# Patient Record
Sex: FEMALE | Race: WHITE | Employment: UNEMPLOYED | URBAN - NONMETROPOLITAN AREA
[De-identification: names, ages, dates, MRNs, and addresses within clinical notes are randomized per-mention and may not be internally consistent; named-entity substitution may affect disease eponyms.]

---

## 2017-08-17 ENCOUNTER — HOSPITAL ENCOUNTER (OUTPATIENT)
Age: 82
Discharge: HOME | End: 2017-08-17
Payer: MEDICARE

## 2017-08-17 LAB
AVERAGE GLUCOSE: 198 MG/DL (ref 70–126)
CHOLESTEROL, TOTAL: 172 MG/DL (ref 100–199)
CREATININE, URINE: 99.2 MG/DL
HBA1C MFR BLD: 8.6 % (ref 4.4–6.4)
HDLC SERPL-MCNC: 58 MG/DL
LDL CHOLESTEROL CALCULATED: 87 MG/DL
MICROALBUMIN UR-MCNC: 1.98 MG/DL
MICROALBUMIN/CREAT UR-RTO: 20 MG/G (ref 0–30)
THYROXINE (T4): 7.4 UG/DL (ref 4.5–12)
TRIGL SERPL-MCNC: 133 MG/DL (ref 0–199)
TSH SERPL DL<=0.05 MIU/L-ACNC: 3.71 UIU/ML (ref 0.4–4.2)

## 2017-08-17 PROCEDURE — 84436 ASSAY OF TOTAL THYROXINE: CPT

## 2017-08-17 PROCEDURE — 82043 UR ALBUMIN QUANTITATIVE: CPT

## 2017-08-17 PROCEDURE — 83036 HEMOGLOBIN GLYCOSYLATED A1C: CPT

## 2017-08-17 PROCEDURE — 84443 ASSAY THYROID STIM HORMONE: CPT

## 2017-08-17 PROCEDURE — 36415 COLL VENOUS BLD VENIPUNCTURE: CPT

## 2017-08-17 PROCEDURE — 80061 LIPID PANEL: CPT

## 2017-11-16 ENCOUNTER — HOSPITAL ENCOUNTER (OUTPATIENT)
Age: 82
Discharge: HOME | End: 2017-11-16
Payer: COMMERCIAL

## 2017-11-16 LAB
ALBUMIN SERPL-MCNC: 4.1 G/DL (ref 3.5–5.1)
ALP BLD-CCNC: 59 U/L (ref 38–126)
ALT SERPL-CCNC: 17 U/L (ref 11–66)
ANION GAP SERPL CALCULATED.3IONS-SCNC: 15 MEQ/L (ref 8–16)
AST SERPL-CCNC: 19 U/L (ref 5–40)
AVERAGE GLUCOSE: 189 MG/DL (ref 70–126)
BASOPHILS # BLD: 0.3 %
BASOPHILS ABSOLUTE: 0 THOU/MM3 (ref 0–0.1)
BILIRUB SERPL-MCNC: 0.5 MG/DL (ref 0.3–1.2)
BUN BLDV-MCNC: 16 MG/DL (ref 7–22)
CALCIUM SERPL-MCNC: 9.6 MG/DL (ref 8.5–10.5)
CHLORIDE BLD-SCNC: 102 MEQ/L (ref 98–111)
CO2: 27 MEQ/L (ref 23–33)
CREAT SERPL-MCNC: 0.9 MG/DL (ref 0.4–1.2)
CREATININE, URINE: 103.6 MG/DL
EOSINOPHIL # BLD: 2.1 %
EOSINOPHILS ABSOLUTE: 0.2 THOU/MM3 (ref 0–0.4)
GFR SERPL CREATININE-BSD FRML MDRD: 60 ML/MIN/1.73M2
GLUCOSE BLD-MCNC: 96 MG/DL (ref 70–108)
HBA1C MFR BLD: 8.3 % (ref 4.4–6.4)
HCT VFR BLD CALC: 35.8 % (ref 37–47)
HEMOGLOBIN: 11.7 GM/DL (ref 12–16)
LYMPHOCYTES # BLD: 19.1 %
LYMPHOCYTES ABSOLUTE: 1.5 THOU/MM3 (ref 1–4.8)
MCH RBC QN AUTO: 31.2 PG (ref 27–31)
MCHC RBC AUTO-ENTMCNC: 32.6 GM/DL (ref 33–37)
MCV RBC AUTO: 95.6 FL (ref 81–99)
MICROALBUMIN UR-MCNC: 4.48 MG/DL
MICROALBUMIN/CREAT UR-RTO: 43 MG/G (ref 0–30)
MONOCYTES # BLD: 5.7 %
MONOCYTES ABSOLUTE: 0.4 THOU/MM3 (ref 0.4–1.3)
NUCLEATED RED BLOOD CELLS: 0 /100 WBC
PDW BLD-RTO: 13.8 % (ref 11.5–14.5)
PLATELET # BLD: 215 THOU/MM3 (ref 130–400)
PMV BLD AUTO: 9.1 MCM (ref 7.4–10.4)
POTASSIUM SERPL-SCNC: 4.8 MEQ/L (ref 3.5–5.2)
RBC # BLD: 3.74 MILL/MM3 (ref 4.2–5.4)
SEG NEUTROPHILS: 72.8 %
SEGMENTED NEUTROPHILS ABSOLUTE COUNT: 5.6 THOU/MM3 (ref 1.8–7.7)
SODIUM BLD-SCNC: 144 MEQ/L (ref 135–145)
TOTAL PROTEIN: 6.7 G/DL (ref 6.1–8)
WBC # BLD: 7.7 THOU/MM3 (ref 4.8–10.8)

## 2017-11-16 PROCEDURE — 83036 HEMOGLOBIN GLYCOSYLATED A1C: CPT

## 2017-11-16 PROCEDURE — 82043 UR ALBUMIN QUANTITATIVE: CPT

## 2017-11-16 PROCEDURE — 85025 COMPLETE CBC W/AUTO DIFF WBC: CPT

## 2017-11-16 PROCEDURE — 80053 COMPREHEN METABOLIC PANEL: CPT

## 2017-11-16 PROCEDURE — 36415 COLL VENOUS BLD VENIPUNCTURE: CPT

## 2018-01-01 ENCOUNTER — HOSPITAL ENCOUNTER (OUTPATIENT)
Dept: GENERAL RADIOLOGY | Age: 83
Discharge: HOME | End: 2018-12-07
Payer: MEDICARE

## 2018-01-01 ENCOUNTER — HOSPITAL ENCOUNTER (OUTPATIENT)
Age: 83
Discharge: HOME | End: 2018-12-07
Payer: MEDICARE

## 2018-01-01 ENCOUNTER — HOSPITAL ENCOUNTER (OUTPATIENT)
Age: 83
Discharge: HOME | End: 2018-11-06
Payer: MEDICARE

## 2018-01-01 DIAGNOSIS — M25.552 LEFT HIP PAIN: ICD-10-CM

## 2018-01-01 LAB
ALBUMIN SERPL-MCNC: 3.9 G/DL (ref 3.5–5.1)
ALP BLD-CCNC: 65 U/L (ref 38–126)
ALT SERPL-CCNC: 91 U/L (ref 11–66)
ANION GAP SERPL CALCULATED.3IONS-SCNC: 15 MEQ/L (ref 8–16)
ANION GAP SERPL CALCULATED.3IONS-SCNC: 17 MEQ/L (ref 8–16)
AST SERPL-CCNC: 41 U/L (ref 5–40)
AVERAGE GLUCOSE: 144 MG/DL (ref 70–126)
BILIRUB SERPL-MCNC: 0.5 MG/DL (ref 0.3–1.2)
BUN BLDV-MCNC: 19 MG/DL (ref 7–22)
BUN BLDV-MCNC: 35 MG/DL (ref 7–22)
CALCIUM SERPL-MCNC: 9.3 MG/DL (ref 8.5–10.5)
CALCIUM SERPL-MCNC: 9.6 MG/DL (ref 8.5–10.5)
CHLORIDE BLD-SCNC: 105 MEQ/L (ref 98–111)
CHLORIDE BLD-SCNC: 111 MEQ/L (ref 98–111)
CO2: 20 MEQ/L (ref 23–33)
CO2: 22 MEQ/L (ref 23–33)
CREAT SERPL-MCNC: 1.2 MG/DL (ref 0.4–1.2)
CREAT SERPL-MCNC: 1.3 MG/DL (ref 0.4–1.2)
CREATININE, URINE: 103.5 MG/DL
GFR SERPL CREATININE-BSD FRML MDRD: 39 ML/MIN/1.73M2
GFR SERPL CREATININE-BSD FRML MDRD: 43 ML/MIN/1.73M2
GLUCOSE BLD-MCNC: 107 MG/DL (ref 70–108)
GLUCOSE BLD-MCNC: 163 MG/DL (ref 70–108)
HBA1C MFR BLD: 6.8 % (ref 4.4–6.4)
MICROALBUMIN UR-MCNC: 9.65 MG/DL
MICROALBUMIN/CREAT UR-RTO: 93 MG/G (ref 0–30)
POTASSIUM SERPL-SCNC: 4.9 MEQ/L (ref 3.5–5.2)
POTASSIUM SERPL-SCNC: 5.1 MEQ/L (ref 3.5–5.2)
SODIUM BLD-SCNC: 144 MEQ/L (ref 135–145)
SODIUM BLD-SCNC: 146 MEQ/L (ref 135–145)
TOTAL PROTEIN: 6.5 G/DL (ref 6.1–8)

## 2018-01-01 PROCEDURE — 36415 COLL VENOUS BLD VENIPUNCTURE: CPT

## 2018-01-01 PROCEDURE — 80048 BASIC METABOLIC PNL TOTAL CA: CPT

## 2018-01-01 PROCEDURE — 82043 UR ALBUMIN QUANTITATIVE: CPT

## 2018-01-01 PROCEDURE — 80053 COMPREHEN METABOLIC PANEL: CPT

## 2018-01-01 PROCEDURE — 83036 HEMOGLOBIN GLYCOSYLATED A1C: CPT

## 2018-01-01 PROCEDURE — 73502 X-RAY EXAM HIP UNI 2-3 VIEWS: CPT

## 2018-02-21 ENCOUNTER — HOSPITAL ENCOUNTER (OUTPATIENT)
Age: 83
Discharge: HOME | End: 2018-02-21
Payer: MEDICARE

## 2018-02-21 LAB
ANION GAP SERPL CALCULATED.3IONS-SCNC: 13 MEQ/L (ref 8–16)
AVERAGE GLUCOSE: 165 MG/DL (ref 70–126)
BUN BLDV-MCNC: 24 MG/DL (ref 7–22)
C-PEPTIDE: 4.1 NG/ML (ref 1.1–4.4)
CALCIUM SERPL-MCNC: 10.5 MG/DL (ref 8.5–10.5)
CHLORIDE BLD-SCNC: 106 MEQ/L (ref 98–111)
CO2: 25 MEQ/L (ref 23–33)
CREAT SERPL-MCNC: 1 MG/DL (ref 0.4–1.2)
CREATININE, URINE: 154.7 MG/DL
GFR SERPL CREATININE-BSD FRML MDRD: 53 ML/MIN/1.73M2
GLUCOSE BLD-MCNC: 155 MG/DL (ref 70–108)
HBA1C MFR BLD: 7.5 % (ref 4.4–6.4)
MICROALBUMIN UR-MCNC: 9.52 MG/DL
MICROALBUMIN/CREAT UR-RTO: 62 MG/G (ref 0–30)
POTASSIUM SERPL-SCNC: 5.1 MEQ/L (ref 3.5–5.2)
SODIUM BLD-SCNC: 144 MEQ/L (ref 135–145)

## 2018-02-21 PROCEDURE — 83036 HEMOGLOBIN GLYCOSYLATED A1C: CPT

## 2018-02-21 PROCEDURE — 84681 ASSAY OF C-PEPTIDE: CPT

## 2018-02-21 PROCEDURE — 80048 BASIC METABOLIC PNL TOTAL CA: CPT

## 2018-02-21 PROCEDURE — 82043 UR ALBUMIN QUANTITATIVE: CPT

## 2018-02-21 PROCEDURE — 36415 COLL VENOUS BLD VENIPUNCTURE: CPT

## 2018-07-02 ENCOUNTER — HOSPITAL ENCOUNTER (OUTPATIENT)
Age: 83
Discharge: HOME | End: 2018-07-02
Payer: MEDICARE

## 2018-07-02 LAB
ANION GAP SERPL CALCULATED.3IONS-SCNC: 16 MEQ/L (ref 8–16)
AVERAGE GLUCOSE: 180 MG/DL (ref 70–126)
BUN BLDV-MCNC: 24 MG/DL (ref 7–22)
CALCIUM SERPL-MCNC: 10 MG/DL (ref 8.5–10.5)
CHLORIDE BLD-SCNC: 102 MEQ/L (ref 98–111)
CHOLESTEROL, TOTAL: 120 MG/DL (ref 100–199)
CO2: 24 MEQ/L (ref 23–33)
CREAT SERPL-MCNC: 1.1 MG/DL (ref 0.4–1.2)
GFR SERPL CREATININE-BSD FRML MDRD: 47 ML/MIN/1.73M2
GLUCOSE BLD-MCNC: 161 MG/DL (ref 70–108)
HBA1C MFR BLD: 8 % (ref 4.4–6.4)
HDLC SERPL-MCNC: 61 MG/DL
LDL CHOLESTEROL CALCULATED: 44 MG/DL
POTASSIUM SERPL-SCNC: 4.9 MEQ/L (ref 3.5–5.2)
SODIUM BLD-SCNC: 142 MEQ/L (ref 135–145)
TRIGL SERPL-MCNC: 76 MG/DL (ref 0–199)
TSH SERPL DL<=0.05 MIU/L-ACNC: 1.96 UIU/ML (ref 0.4–4.2)

## 2018-07-02 PROCEDURE — 83036 HEMOGLOBIN GLYCOSYLATED A1C: CPT

## 2018-07-02 PROCEDURE — 80061 LIPID PANEL: CPT

## 2018-07-02 PROCEDURE — 84443 ASSAY THYROID STIM HORMONE: CPT

## 2018-07-02 PROCEDURE — 80048 BASIC METABOLIC PNL TOTAL CA: CPT

## 2018-07-02 PROCEDURE — 84436 ASSAY OF TOTAL THYROXINE: CPT

## 2018-07-02 PROCEDURE — 36415 COLL VENOUS BLD VENIPUNCTURE: CPT

## 2018-07-03 LAB — THYROXINE (T4): 10.3 UG/DL (ref 4.5–12)

## 2019-01-01 ENCOUNTER — APPOINTMENT (OUTPATIENT)
Dept: CT IMAGING | Age: 84
DRG: 280 | End: 2019-01-01
Payer: MEDICARE

## 2019-01-01 ENCOUNTER — HOSPITAL ENCOUNTER (INPATIENT)
Age: 84
LOS: 6 days | Discharge: HOSPICE | DRG: 280 | End: 2019-09-15
Attending: EMERGENCY MEDICINE | Admitting: HOSPITALIST
Payer: MEDICARE

## 2019-01-01 ENCOUNTER — APPOINTMENT (OUTPATIENT)
Dept: GENERAL RADIOLOGY | Age: 84
DRG: 280 | End: 2019-01-01
Payer: MEDICARE

## 2019-01-01 ENCOUNTER — APPOINTMENT (OUTPATIENT)
Dept: CARDIAC CATH/INVASIVE PROCEDURES | Age: 84
DRG: 280 | End: 2019-01-01
Payer: MEDICARE

## 2019-01-01 ENCOUNTER — APPOINTMENT (OUTPATIENT)
Dept: ULTRASOUND IMAGING | Age: 84
DRG: 280 | End: 2019-01-01
Payer: MEDICARE

## 2019-01-01 ENCOUNTER — HOSPITAL ENCOUNTER (OUTPATIENT)
Dept: GENERAL RADIOLOGY | Age: 84
Discharge: HOME | End: 2019-04-15
Payer: MEDICARE

## 2019-01-01 ENCOUNTER — HOSPITAL ENCOUNTER (OUTPATIENT)
Age: 84
Discharge: HOME | End: 2019-03-04
Payer: MEDICARE

## 2019-01-01 ENCOUNTER — HOSPITAL ENCOUNTER (OUTPATIENT)
Age: 84
Discharge: HOME | End: 2019-07-09
Payer: MEDICARE

## 2019-01-01 ENCOUNTER — HOSPITAL ENCOUNTER (OUTPATIENT)
Age: 84
Discharge: HOME | End: 2019-04-15
Payer: MEDICARE

## 2019-01-01 ENCOUNTER — HOSPITAL ENCOUNTER (INPATIENT)
Age: 84
LOS: 1 days | DRG: 951 | End: 2019-09-15
Attending: INTERNAL MEDICINE | Admitting: INTERNAL MEDICINE

## 2019-01-01 VITALS
OXYGEN SATURATION: 100 % | HEIGHT: 62 IN | WEIGHT: 132.4 LBS | SYSTOLIC BLOOD PRESSURE: 82 MMHG | BODY MASS INDEX: 24.37 KG/M2 | TEMPERATURE: 97.5 F | HEART RATE: 73 BPM | RESPIRATION RATE: 12 BRPM | DIASTOLIC BLOOD PRESSURE: 53 MMHG

## 2019-01-01 VITALS
WEIGHT: 132 LBS | TEMPERATURE: 97.5 F | OXYGEN SATURATION: 100 % | BODY MASS INDEX: 24.29 KG/M2 | DIASTOLIC BLOOD PRESSURE: 53 MMHG | HEIGHT: 62 IN | RESPIRATION RATE: 4 BRPM | SYSTOLIC BLOOD PRESSURE: 82 MMHG | HEART RATE: 73 BPM

## 2019-01-01 DIAGNOSIS — R79.89 ELEVATED BRAIN NATRIURETIC PEPTIDE (BNP) LEVEL: ICD-10-CM

## 2019-01-01 DIAGNOSIS — I21.4 NSTEMI (NON-ST ELEVATED MYOCARDIAL INFARCTION) (HCC): Primary | ICD-10-CM

## 2019-01-01 DIAGNOSIS — J18.9 PNEUMONIA DUE TO INFECTIOUS ORGANISM, UNSPECIFIED LATERALITY, UNSPECIFIED PART OF LUNG: ICD-10-CM

## 2019-01-01 DIAGNOSIS — R77.8 ELEVATED TROPONIN: ICD-10-CM

## 2019-01-01 LAB
ALBUMIN SERPL-MCNC: 3.5 G/DL (ref 3.5–5.1)
ALBUMIN SERPL-MCNC: 4.6 G/DL (ref 3.5–5.1)
ALP BLD-CCNC: 61 U/L (ref 38–126)
ALP BLD-CCNC: 77 U/L (ref 38–126)
ALT SERPL-CCNC: 149 U/L (ref 11–66)
ALT SERPL-CCNC: 239 U/L (ref 11–66)
AMMONIA: 27 UMOL/L (ref 11–60)
AMORPHOUS: ABNORMAL
ANION GAP SERPL CALCULATED.3IONS-SCNC: 12 MEQ/L (ref 8–16)
ANION GAP SERPL CALCULATED.3IONS-SCNC: 14 MEQ/L (ref 8–16)
ANION GAP SERPL CALCULATED.3IONS-SCNC: 17 MEQ/L (ref 8–16)
ANION GAP SERPL CALCULATED.3IONS-SCNC: 19 MEQ/L (ref 8–16)
ANION GAP SERPL CALCULATED.3IONS-SCNC: 22 MEQ/L (ref 8–16)
ANION GAP SERPL CALCULATED.3IONS-SCNC: 23 MEQ/L (ref 8–16)
ANION GAP SERPL CALCULATED.3IONS-SCNC: 23 MEQ/L (ref 8–16)
APTT: 103.4 SECONDS (ref 22–38)
APTT: 28 SECONDS (ref 22–38)
APTT: 48.9 SECONDS (ref 22–38)
APTT: 53.8 SECONDS (ref 22–38)
APTT: 58.2 SECONDS (ref 22–38)
APTT: 65.6 SECONDS (ref 22–38)
APTT: 76.4 SECONDS (ref 22–38)
APTT: 85.9 SECONDS (ref 22–38)
APTT: 91.1 SECONDS (ref 22–38)
AST SERPL-CCNC: 177 U/L (ref 5–40)
AST SERPL-CCNC: 343 U/L (ref 5–40)
AVERAGE GLUCOSE: 153 MG/DL (ref 70–126)
AVERAGE GLUCOSE: 156 MG/DL (ref 70–126)
BACTERIA: ABNORMAL /HPF
BASOPHILS # BLD: 0.4 %
BASOPHILS ABSOLUTE: 0 THOU/MM3 (ref 0–0.1)
BILIRUB SERPL-MCNC: 0.5 MG/DL (ref 0.3–1.2)
BILIRUB SERPL-MCNC: 0.6 MG/DL (ref 0.3–1.2)
BILIRUBIN DIRECT: 0.3 MG/DL (ref 0–0.3)
BILIRUBIN URINE: ABNORMAL
BLOOD, URINE: NEGATIVE
BUN BLDV-MCNC: 33 MG/DL (ref 7–22)
BUN BLDV-MCNC: 41 MG/DL (ref 7–22)
BUN BLDV-MCNC: 41 MG/DL (ref 7–22)
BUN BLDV-MCNC: 51 MG/DL (ref 7–22)
BUN BLDV-MCNC: 55 MG/DL (ref 7–22)
BUN BLDV-MCNC: 57 MG/DL (ref 7–22)
BUN BLDV-MCNC: 74 MG/DL (ref 7–22)
CALCIUM SERPL-MCNC: 10.1 MG/DL (ref 8.5–10.5)
CALCIUM SERPL-MCNC: 8 MG/DL (ref 8.5–10.5)
CALCIUM SERPL-MCNC: 8.3 MG/DL (ref 8.5–10.5)
CALCIUM SERPL-MCNC: 8.9 MG/DL (ref 8.5–10.5)
CALCIUM SERPL-MCNC: 9.2 MG/DL (ref 8.5–10.5)
CALCIUM SERPL-MCNC: 9.7 MG/DL (ref 8.5–10.5)
CALCIUM SERPL-MCNC: 9.7 MG/DL (ref 8.5–10.5)
CASTS 2: ABNORMAL /LPF
CASTS UA: ABNORMAL /LPF
CHARACTER, URINE: CLEAR
CHLORIDE BLD-SCNC: 101 MEQ/L (ref 98–111)
CHLORIDE BLD-SCNC: 103 MEQ/L (ref 98–111)
CHLORIDE BLD-SCNC: 103 MEQ/L (ref 98–111)
CHLORIDE BLD-SCNC: 104 MEQ/L (ref 98–111)
CHLORIDE BLD-SCNC: 104 MEQ/L (ref 98–111)
CHLORIDE BLD-SCNC: 95 MEQ/L (ref 98–111)
CHLORIDE BLD-SCNC: 99 MEQ/L (ref 98–111)
CHLORIDE, URINE: < 20 MEQ/L
CHOLESTEROL, TOTAL: 132 MG/DL (ref 100–199)
CO2: 14 MEQ/L (ref 23–33)
CO2: 15 MEQ/L (ref 23–33)
CO2: 17 MEQ/L (ref 23–33)
CO2: 19 MEQ/L (ref 23–33)
CO2: 22 MEQ/L (ref 23–33)
CO2: 23 MEQ/L (ref 23–33)
CO2: 24 MEQ/L (ref 23–33)
COLOR: YELLOW
CREAT SERPL-MCNC: 1.1 MG/DL (ref 0.4–1.2)
CREAT SERPL-MCNC: 1.5 MG/DL (ref 0.4–1.2)
CREAT SERPL-MCNC: 1.8 MG/DL (ref 0.4–1.2)
CREAT SERPL-MCNC: 2.2 MG/DL (ref 0.4–1.2)
CREAT SERPL-MCNC: 2.3 MG/DL (ref 0.4–1.2)
CREAT SERPL-MCNC: 2.3 MG/DL (ref 0.4–1.2)
CREAT SERPL-MCNC: 2.4 MG/DL (ref 0.4–1.2)
CREATININE, URINE: 63 MG/DL
CRYSTALS, UA: ABNORMAL
EKG ATRIAL RATE: 104 BPM
EKG P AXIS: 59 DEGREES
EKG P-R INTERVAL: 178 MS
EKG Q-T INTERVAL: 376 MS
EKG QRS DURATION: 164 MS
EKG QTC CALCULATION (BAZETT): 494 MS
EKG R AXIS: 99 DEGREES
EKG T AXIS: 3 DEGREES
EKG VENTRICULAR RATE: 104 BPM
EOSINOPHIL # BLD: 0.4 %
EOSINOPHILS ABSOLUTE: 0 THOU/MM3 (ref 0–0.4)
EPITHELIAL CELLS, UA: ABNORMAL /HPF
ERYTHROCYTE [DISTWIDTH] IN BLOOD BY AUTOMATED COUNT: 13.3 % (ref 11.5–14.5)
ERYTHROCYTE [DISTWIDTH] IN BLOOD BY AUTOMATED COUNT: 13.6 % (ref 11.5–14.5)
ERYTHROCYTE [DISTWIDTH] IN BLOOD BY AUTOMATED COUNT: 48.4 FL (ref 35–45)
ERYTHROCYTE [DISTWIDTH] IN BLOOD BY AUTOMATED COUNT: 48.5 FL (ref 35–45)
FLU A ANTIGEN: NEGATIVE
FLU B ANTIGEN: NEGATIVE
GFR SERPL CREATININE-BSD FRML MDRD: 19 ML/MIN/1.73M2
GFR SERPL CREATININE-BSD FRML MDRD: 20 ML/MIN/1.73M2
GFR SERPL CREATININE-BSD FRML MDRD: 20 ML/MIN/1.73M2
GFR SERPL CREATININE-BSD FRML MDRD: 21 ML/MIN/1.73M2
GFR SERPL CREATININE-BSD FRML MDRD: 27 ML/MIN/1.73M2
GFR SERPL CREATININE-BSD FRML MDRD: 33 ML/MIN/1.73M2
GFR SERPL CREATININE-BSD FRML MDRD: 47 ML/MIN/1.73M2
GLUCOSE BLD-MCNC: 102 MG/DL (ref 70–108)
GLUCOSE BLD-MCNC: 110 MG/DL (ref 70–108)
GLUCOSE BLD-MCNC: 187 MG/DL (ref 70–108)
GLUCOSE BLD-MCNC: 201 MG/DL (ref 70–108)
GLUCOSE BLD-MCNC: 206 MG/DL (ref 70–108)
GLUCOSE BLD-MCNC: 221 MG/DL (ref 70–108)
GLUCOSE BLD-MCNC: 229 MG/DL (ref 70–108)
GLUCOSE BLD-MCNC: 238 MG/DL (ref 70–108)
GLUCOSE BLD-MCNC: 242 MG/DL (ref 70–108)
GLUCOSE BLD-MCNC: 245 MG/DL (ref 70–108)
GLUCOSE BLD-MCNC: 247 MG/DL (ref 70–108)
GLUCOSE BLD-MCNC: 247 MG/DL (ref 70–108)
GLUCOSE BLD-MCNC: 259 MG/DL (ref 70–108)
GLUCOSE BLD-MCNC: 318 MG/DL (ref 70–108)
GLUCOSE BLD-MCNC: 352 MG/DL (ref 70–108)
GLUCOSE BLD-MCNC: 372 MG/DL (ref 70–108)
GLUCOSE URINE: NEGATIVE MG/DL
HBA1C MFR BLD: 7.1 % (ref 4.4–6.4)
HBA1C MFR BLD: 7.2 % (ref 4.4–6.4)
HCT VFR BLD CALC: 31.4 % (ref 37–47)
HCT VFR BLD CALC: 35.9 % (ref 37–47)
HDLC SERPL-MCNC: 59 MG/DL
HEMOGLOBIN: 10.1 GM/DL (ref 12–16)
HEMOGLOBIN: 11.1 GM/DL (ref 12–16)
ICTOTEST: NEGATIVE
IMMATURE GRANS (ABS): 0.05 THOU/MM3 (ref 0–0.07)
IMMATURE GRANULOCYTES: 0 %
KETONES, URINE: ABNORMAL
LDL CHOLESTEROL CALCULATED: 52 MG/DL
LEUKOCYTE ESTERASE, URINE: ABNORMAL
LIPASE: 42.2 U/L (ref 5.6–51.3)
LV EF: 23 %
LVEF MODALITY: NORMAL
LYMPHOCYTES # BLD: 12.3 %
LYMPHOCYTES ABSOLUTE: 1.4 THOU/MM3 (ref 1–4.8)
MAGNESIUM: 2.1 MG/DL (ref 1.6–2.4)
MAGNESIUM: 2.2 MG/DL (ref 1.6–2.4)
MCH RBC QN AUTO: 30.7 PG (ref 26–33)
MCH RBC QN AUTO: 31.5 PG (ref 26–33)
MCHC RBC AUTO-ENTMCNC: 30.9 GM/DL (ref 32.2–35.5)
MCHC RBC AUTO-ENTMCNC: 32.2 GM/DL (ref 32.2–35.5)
MCV RBC AUTO: 97.8 FL (ref 81–99)
MCV RBC AUTO: 99.4 FL (ref 81–99)
MICROALBUMIN UR-MCNC: 3.66 MG/DL
MICROALBUMIN/CREAT UR-RTO: 58 MG/G (ref 0–30)
MISCELLANEOUS LAB TEST RESULT: ABNORMAL
MONOCYTES # BLD: 6.1 %
MONOCYTES ABSOLUTE: 0.7 THOU/MM3 (ref 0.4–1.3)
NITRITE, URINE: NEGATIVE
NUCLEATED RED BLOOD CELLS: 0 /100 WBC
OSMOLALITY CALCULATION: 301.7 MOSMOL/KG (ref 275–300)
PH UA: 5.5 (ref 5–9)
PLATELET # BLD: 225 THOU/MM3 (ref 130–400)
PLATELET # BLD: 278 THOU/MM3 (ref 130–400)
PMV BLD AUTO: 10.2 FL (ref 9.4–12.4)
PMV BLD AUTO: 10.4 FL (ref 9.4–12.4)
POTASSIUM REFLEX MAGNESIUM: 3.8 MEQ/L (ref 3.5–5.2)
POTASSIUM SERPL-SCNC: 3.9 MEQ/L (ref 3.5–5.2)
POTASSIUM SERPL-SCNC: 4.5 MEQ/L (ref 3.5–5.2)
POTASSIUM SERPL-SCNC: 5 MEQ/L (ref 3.5–5.2)
POTASSIUM SERPL-SCNC: 5.2 MEQ/L (ref 3.5–5.2)
POTASSIUM SERPL-SCNC: 5.6 MEQ/L (ref 3.5–5.2)
POTASSIUM SERPL-SCNC: 6 MEQ/L (ref 3.5–5.2)
PRO-BNP: ABNORMAL PG/ML (ref 0–1800)
PROTEIN UA: 100
RBC # BLD: 3.21 MILL/MM3 (ref 4.2–5.4)
RBC # BLD: 3.61 MILL/MM3 (ref 4.2–5.4)
RBC URINE: ABNORMAL /HPF
RENAL EPITHELIAL, UA: ABNORMAL
SEG NEUTROPHILS: 80.4 %
SEGMENTED NEUTROPHILS ABSOLUTE COUNT: 9.1 THOU/MM3 (ref 1.8–7.7)
SODIUM BLD-SCNC: 134 MEQ/L (ref 135–145)
SODIUM BLD-SCNC: 139 MEQ/L (ref 135–145)
SODIUM BLD-SCNC: 140 MEQ/L (ref 135–145)
SODIUM BLD-SCNC: 141 MEQ/L (ref 135–145)
SODIUM BLD-SCNC: 141 MEQ/L (ref 135–145)
SODIUM URINE: 21 MEQ/L
SODIUM URINE: < 20 MEQ/L
SPECIFIC GRAVITY, URINE: >= 1.03 (ref 1–1.03)
TOTAL PROTEIN: 5.9 G/DL (ref 6.1–8)
TOTAL PROTEIN: 7.3 G/DL (ref 6.1–8)
TRIGL SERPL-MCNC: 106 MG/DL (ref 0–199)
TROPONIN T: 0.52 NG/ML
TROPONIN T: 0.65 NG/ML
TROPONIN T: 3.97 NG/ML
TROPONIN T: 5.9 NG/ML
TSH SERPL DL<=0.05 MIU/L-ACNC: 3.01 UIU/ML (ref 0.4–4.2)
UROBILINOGEN, URINE: 1 EU/DL (ref 0–1)
WBC # BLD: 11.3 THOU/MM3 (ref 4.8–10.8)
WBC # BLD: 14.3 THOU/MM3 (ref 4.8–10.8)
WBC UA: ABNORMAL /HPF
YEAST: ABNORMAL

## 2019-01-01 PROCEDURE — 93306 TTE W/DOPPLER COMPLETE: CPT

## 2019-01-01 PROCEDURE — APPNB15 APP NON BILLABLE TIME 0-15 MINS: Performed by: NURSE PRACTITIONER

## 2019-01-01 PROCEDURE — 1200000000 HC SEMI PRIVATE

## 2019-01-01 PROCEDURE — 99223 1ST HOSP IP/OBS HIGH 75: CPT | Performed by: INTERNAL MEDICINE

## 2019-01-01 PROCEDURE — 99232 SBSQ HOSP IP/OBS MODERATE 35: CPT | Performed by: INTERNAL MEDICINE

## 2019-01-01 PROCEDURE — 2709999900 HC NON-CHARGEABLE SUPPLY

## 2019-01-01 PROCEDURE — 6360000002 HC RX W HCPCS: Performed by: INTERNAL MEDICINE

## 2019-01-01 PROCEDURE — 6370000000 HC RX 637 (ALT 250 FOR IP): Performed by: INTERNAL MEDICINE

## 2019-01-01 PROCEDURE — 81001 URINALYSIS AUTO W/SCOPE: CPT

## 2019-01-01 PROCEDURE — 80048 BASIC METABOLIC PNL TOTAL CA: CPT

## 2019-01-01 PROCEDURE — 83735 ASSAY OF MAGNESIUM: CPT

## 2019-01-01 PROCEDURE — 93010 ELECTROCARDIOGRAM REPORT: CPT | Performed by: NUCLEAR MEDICINE

## 2019-01-01 PROCEDURE — 2580000003 HC RX 258: Performed by: HOSPITALIST

## 2019-01-01 PROCEDURE — 36415 COLL VENOUS BLD VENIPUNCTURE: CPT

## 2019-01-01 PROCEDURE — 87804 INFLUENZA ASSAY W/OPTIC: CPT

## 2019-01-01 PROCEDURE — 6360000002 HC RX W HCPCS: Performed by: HOSPITALIST

## 2019-01-01 PROCEDURE — 76770 US EXAM ABDO BACK WALL COMP: CPT

## 2019-01-01 PROCEDURE — 2060000000 HC ICU INTERMEDIATE R&B

## 2019-01-01 PROCEDURE — 84484 ASSAY OF TROPONIN QUANT: CPT

## 2019-01-01 PROCEDURE — 85027 COMPLETE CBC AUTOMATED: CPT

## 2019-01-01 PROCEDURE — 85730 THROMBOPLASTIN TIME PARTIAL: CPT

## 2019-01-01 PROCEDURE — 6370000000 HC RX 637 (ALT 250 FOR IP): Performed by: HOSPITALIST

## 2019-01-01 PROCEDURE — 82248 BILIRUBIN DIRECT: CPT

## 2019-01-01 PROCEDURE — 6370000000 HC RX 637 (ALT 250 FOR IP): Performed by: NURSE PRACTITIONER

## 2019-01-01 PROCEDURE — 93005 ELECTROCARDIOGRAM TRACING: CPT | Performed by: EMERGENCY MEDICINE

## 2019-01-01 PROCEDURE — 2580000003 HC RX 258: Performed by: INTERNAL MEDICINE

## 2019-01-01 PROCEDURE — 99285 EMERGENCY DEPT VISIT HI MDM: CPT

## 2019-01-01 PROCEDURE — 2580000003 HC RX 258: Performed by: EMERGENCY MEDICINE

## 2019-01-01 PROCEDURE — 94760 N-INVAS EAR/PLS OXIMETRY 1: CPT

## 2019-01-01 PROCEDURE — 84300 ASSAY OF URINE SODIUM: CPT

## 2019-01-01 PROCEDURE — 99233 SBSQ HOSP IP/OBS HIGH 50: CPT | Performed by: INTERNAL MEDICINE

## 2019-01-01 PROCEDURE — 83690 ASSAY OF LIPASE: CPT

## 2019-01-01 PROCEDURE — 2500000003 HC RX 250 WO HCPCS: Performed by: INTERNAL MEDICINE

## 2019-01-01 PROCEDURE — 99232 SBSQ HOSP IP/OBS MODERATE 35: CPT | Performed by: PHYSICIAN ASSISTANT

## 2019-01-01 PROCEDURE — 80053 COMPREHEN METABOLIC PANEL: CPT

## 2019-01-01 PROCEDURE — 85025 COMPLETE CBC W/AUTO DIFF WBC: CPT

## 2019-01-01 PROCEDURE — 80061 LIPID PANEL: CPT

## 2019-01-01 PROCEDURE — 82948 REAGENT STRIP/BLOOD GLUCOSE: CPT

## 2019-01-01 PROCEDURE — 82043 UR ALBUMIN QUANTITATIVE: CPT

## 2019-01-01 PROCEDURE — 83036 HEMOGLOBIN GLYCOSYLATED A1C: CPT

## 2019-01-01 PROCEDURE — 6370000000 HC RX 637 (ALT 250 FOR IP): Performed by: EMERGENCY MEDICINE

## 2019-01-01 PROCEDURE — 51798 US URINE CAPACITY MEASURE: CPT

## 2019-01-01 PROCEDURE — 99232 SBSQ HOSP IP/OBS MODERATE 35: CPT | Performed by: NURSE PRACTITIONER

## 2019-01-01 PROCEDURE — 99223 1ST HOSP IP/OBS HIGH 75: CPT | Performed by: HOSPITALIST

## 2019-01-01 PROCEDURE — 82140 ASSAY OF AMMONIA: CPT

## 2019-01-01 PROCEDURE — 73564 X-RAY EXAM KNEE 4 OR MORE: CPT

## 2019-01-01 PROCEDURE — 70450 CT HEAD/BRAIN W/O DYE: CPT

## 2019-01-01 PROCEDURE — 82436 ASSAY OF URINE CHLORIDE: CPT

## 2019-01-01 PROCEDURE — 6360000002 HC RX W HCPCS: Performed by: PHYSICIAN ASSISTANT

## 2019-01-01 PROCEDURE — 84443 ASSAY THYROID STIM HORMONE: CPT

## 2019-01-01 PROCEDURE — 71046 X-RAY EXAM CHEST 2 VIEWS: CPT

## 2019-01-01 PROCEDURE — 83880 ASSAY OF NATRIURETIC PEPTIDE: CPT

## 2019-01-01 PROCEDURE — 71045 X-RAY EXAM CHEST 1 VIEW: CPT

## 2019-01-01 PROCEDURE — 6360000002 HC RX W HCPCS: Performed by: EMERGENCY MEDICINE

## 2019-01-01 PROCEDURE — 73502 X-RAY EXAM HIP UNI 2-3 VIEWS: CPT

## 2019-01-01 RX ORDER — SODIUM CHLORIDE 0.9 % (FLUSH) 0.9 %
10 SYRINGE (ML) INJECTION PRN
Status: CANCELLED | OUTPATIENT
Start: 2019-01-01

## 2019-01-01 RX ORDER — LORAZEPAM 2 MG/ML
1 INJECTION INTRAMUSCULAR
Status: CANCELLED | OUTPATIENT
Start: 2019-01-01

## 2019-01-01 RX ORDER — SODIUM CHLORIDE 9 MG/ML
INJECTION, SOLUTION INTRAVENOUS CONTINUOUS
Status: DISCONTINUED | OUTPATIENT
Start: 2019-01-01 | End: 2019-01-01

## 2019-01-01 RX ORDER — ALBUMIN (HUMAN) 12.5 G/50ML
25 SOLUTION INTRAVENOUS ONCE
Status: DISCONTINUED | OUTPATIENT
Start: 2019-01-01 | End: 2019-01-01

## 2019-01-01 RX ORDER — DIPHENHYDRAMINE HYDROCHLORIDE, ZINC ACETATE 2; .1 G/100G; G/100G
CREAM TOPICAL 3 TIMES DAILY PRN
Status: DISCONTINUED | OUTPATIENT
Start: 2019-01-01 | End: 2019-01-01 | Stop reason: HOSPADM

## 2019-01-01 RX ORDER — HEPARIN SODIUM 10000 [USP'U]/100ML
12 INJECTION, SOLUTION INTRAVENOUS CONTINUOUS
Status: DISCONTINUED | OUTPATIENT
Start: 2019-01-01 | End: 2019-01-01

## 2019-01-01 RX ORDER — DEXTROSE MONOHYDRATE 50 MG/ML
100 INJECTION, SOLUTION INTRAVENOUS PRN
Status: DISCONTINUED | OUTPATIENT
Start: 2019-01-01 | End: 2019-01-01 | Stop reason: HOSPADM

## 2019-01-01 RX ORDER — GLYCOPYRROLATE 0.2 MG/ML
0.2 INJECTION INTRAMUSCULAR; INTRAVENOUS
Status: DISCONTINUED | OUTPATIENT
Start: 2019-01-01 | End: 2019-01-01 | Stop reason: HOSPADM

## 2019-01-01 RX ORDER — ATORVASTATIN CALCIUM 10 MG/1
10 TABLET, FILM COATED ORAL NIGHTLY
Status: DISCONTINUED | OUTPATIENT
Start: 2019-01-01 | End: 2019-01-01

## 2019-01-01 RX ORDER — 0.9 % SODIUM CHLORIDE 0.9 %
500 INTRAVENOUS SOLUTION INTRAVENOUS ONCE
Status: COMPLETED | OUTPATIENT
Start: 2019-01-01 | End: 2019-01-01

## 2019-01-01 RX ORDER — LORAZEPAM 2 MG/ML
0.5 INJECTION INTRAMUSCULAR
Status: DISCONTINUED | OUTPATIENT
Start: 2019-01-01 | End: 2019-09-16 | Stop reason: HOSPADM

## 2019-01-01 RX ORDER — MORPHINE SULFATE/PF 50 MG/50ML
PATIENT CONTROLLED ANALGESIA SYRINGE INTRAVENOUS CONTINUOUS
Status: DISCONTINUED | OUTPATIENT
Start: 2019-01-01 | End: 2019-01-01 | Stop reason: HOSPADM

## 2019-01-01 RX ORDER — DEXTROSE MONOHYDRATE 25 G/50ML
12.5 INJECTION, SOLUTION INTRAVENOUS PRN
Status: DISCONTINUED | OUTPATIENT
Start: 2019-01-01 | End: 2019-01-01 | Stop reason: HOSPADM

## 2019-01-01 RX ORDER — ONDANSETRON 2 MG/ML
4 INJECTION INTRAMUSCULAR; INTRAVENOUS EVERY 6 HOURS PRN
Status: DISCONTINUED | OUTPATIENT
Start: 2019-01-01 | End: 2019-01-01 | Stop reason: HOSPADM

## 2019-01-01 RX ORDER — SODIUM CHLORIDE 0.9 % (FLUSH) 0.9 %
10 SYRINGE (ML) INJECTION EVERY 12 HOURS SCHEDULED
Status: DISCONTINUED | OUTPATIENT
Start: 2019-01-01 | End: 2019-01-01

## 2019-01-01 RX ORDER — DIPHENHYDRAMINE HYDROCHLORIDE, ZINC ACETATE 2; .1 G/100G; G/100G
CREAM TOPICAL 3 TIMES DAILY PRN
Status: DISCONTINUED | OUTPATIENT
Start: 2019-01-01 | End: 2019-09-16 | Stop reason: HOSPADM

## 2019-01-01 RX ORDER — ONDANSETRON 2 MG/ML
4 INJECTION INTRAMUSCULAR; INTRAVENOUS EVERY 6 HOURS PRN
Status: DISCONTINUED | OUTPATIENT
Start: 2019-01-01 | End: 2019-09-16 | Stop reason: HOSPADM

## 2019-01-01 RX ORDER — GLIMEPIRIDE 1 MG/1
1 TABLET ORAL DAILY
Status: ON HOLD | COMMUNITY
End: 2019-01-01

## 2019-01-01 RX ORDER — ONDANSETRON 2 MG/ML
4 INJECTION INTRAMUSCULAR; INTRAVENOUS EVERY 6 HOURS PRN
Status: CANCELLED | OUTPATIENT
Start: 2019-01-01

## 2019-01-01 RX ORDER — DOCUSATE SODIUM 100 MG/1
100 CAPSULE, LIQUID FILLED ORAL DAILY
Status: DISCONTINUED | OUTPATIENT
Start: 2019-01-01 | End: 2019-01-01 | Stop reason: HOSPADM

## 2019-01-01 RX ORDER — MORPHINE SULFATE 4 MG/ML
4 INJECTION, SOLUTION INTRAMUSCULAR; INTRAVENOUS
Status: DISCONTINUED | OUTPATIENT
Start: 2019-01-01 | End: 2019-01-01 | Stop reason: HOSPADM

## 2019-01-01 RX ORDER — SODIUM POLYSTYRENE SULFONATE 15 G/60ML
30 SUSPENSION ORAL; RECTAL ONCE
Status: COMPLETED | OUTPATIENT
Start: 2019-01-01 | End: 2019-01-01

## 2019-01-01 RX ORDER — LEVOTHYROXINE SODIUM 112 UG/1
112 TABLET ORAL DAILY
Status: DISCONTINUED | OUTPATIENT
Start: 2019-01-01 | End: 2019-01-01 | Stop reason: HOSPADM

## 2019-01-01 RX ORDER — MORPHINE SULFATE/PF 50 MG/50ML
PATIENT CONTROLLED ANALGESIA SYRINGE INTRAVENOUS CONTINUOUS
Status: DISCONTINUED | OUTPATIENT
Start: 2019-01-01 | End: 2019-09-16 | Stop reason: HOSPADM

## 2019-01-01 RX ORDER — SODIUM CHLORIDE 0.9 % (FLUSH) 0.9 %
10 SYRINGE (ML) INJECTION PRN
Status: DISCONTINUED | OUTPATIENT
Start: 2019-01-01 | End: 2019-09-16 | Stop reason: HOSPADM

## 2019-01-01 RX ORDER — HEPARIN SODIUM 1000 [USP'U]/ML
60 INJECTION, SOLUTION INTRAVENOUS; SUBCUTANEOUS ONCE
Status: COMPLETED | OUTPATIENT
Start: 2019-01-01 | End: 2019-01-01

## 2019-01-01 RX ORDER — DIPHENHYDRAMINE HYDROCHLORIDE 50 MG/ML
25 INJECTION INTRAMUSCULAR; INTRAVENOUS EVERY 6 HOURS PRN
Status: DISCONTINUED | OUTPATIENT
Start: 2019-01-01 | End: 2019-01-01 | Stop reason: HOSPADM

## 2019-01-01 RX ORDER — MORPHINE SULFATE 2 MG/ML
2 INJECTION, SOLUTION INTRAMUSCULAR; INTRAVENOUS
Status: DISCONTINUED | OUTPATIENT
Start: 2019-01-01 | End: 2019-01-01 | Stop reason: HOSPADM

## 2019-01-01 RX ORDER — DIPHENHYDRAMINE HYDROCHLORIDE 50 MG/ML
25 INJECTION INTRAMUSCULAR; INTRAVENOUS EVERY 6 HOURS PRN
Status: DISCONTINUED | OUTPATIENT
Start: 2019-01-01 | End: 2019-09-16 | Stop reason: HOSPADM

## 2019-01-01 RX ORDER — DIPHENHYDRAMINE HYDROCHLORIDE 50 MG/ML
25 INJECTION INTRAMUSCULAR; INTRAVENOUS EVERY 6 HOURS PRN
Status: CANCELLED | OUTPATIENT
Start: 2019-01-01

## 2019-01-01 RX ORDER — NICOTINE POLACRILEX 4 MG
15 LOZENGE BUCCAL PRN
Status: DISCONTINUED | OUTPATIENT
Start: 2019-01-01 | End: 2019-01-01 | Stop reason: HOSPADM

## 2019-01-01 RX ORDER — LORAZEPAM 2 MG/ML
1 INJECTION INTRAMUSCULAR
Status: DISCONTINUED | OUTPATIENT
Start: 2019-01-01 | End: 2019-09-16 | Stop reason: HOSPADM

## 2019-01-01 RX ORDER — MIDODRINE HYDROCHLORIDE 10 MG/1
10 TABLET ORAL
Status: DISCONTINUED | OUTPATIENT
Start: 2019-01-01 | End: 2019-01-01

## 2019-01-01 RX ORDER — 0.9 % SODIUM CHLORIDE 0.9 %
250 INTRAVENOUS SOLUTION INTRAVENOUS ONCE
Status: COMPLETED | OUTPATIENT
Start: 2019-01-01 | End: 2019-01-01

## 2019-01-01 RX ORDER — SIMVASTATIN 20 MG
20 TABLET ORAL DAILY
Status: DISCONTINUED | OUTPATIENT
Start: 2019-01-01 | End: 2019-01-01

## 2019-01-01 RX ORDER — ATORVASTATIN CALCIUM 10 MG/1
10 TABLET, FILM COATED ORAL DAILY
Status: ON HOLD | COMMUNITY
End: 2019-01-01

## 2019-01-01 RX ORDER — GLYCOPYRROLATE 0.2 MG/ML
0.2 INJECTION INTRAMUSCULAR; INTRAVENOUS
Status: CANCELLED | OUTPATIENT
Start: 2019-01-01

## 2019-01-01 RX ORDER — SODIUM CHLORIDE 0.9 % (FLUSH) 0.9 %
10 SYRINGE (ML) INJECTION PRN
Status: DISCONTINUED | OUTPATIENT
Start: 2019-01-01 | End: 2019-01-01 | Stop reason: HOSPADM

## 2019-01-01 RX ORDER — 0.9 % SODIUM CHLORIDE 0.9 %
250 INTRAVENOUS SOLUTION INTRAVENOUS ONCE
Status: DISCONTINUED | OUTPATIENT
Start: 2019-01-01 | End: 2019-01-01

## 2019-01-01 RX ORDER — LORAZEPAM 2 MG/ML
0.5 INJECTION INTRAMUSCULAR EVERY 6 HOURS PRN
Status: DISCONTINUED | OUTPATIENT
Start: 2019-01-01 | End: 2019-01-01 | Stop reason: SDUPTHER

## 2019-01-01 RX ORDER — ACETAMINOPHEN 325 MG/1
650 TABLET ORAL ONCE
Status: COMPLETED | OUTPATIENT
Start: 2019-01-01 | End: 2019-01-01

## 2019-01-01 RX ORDER — SIMVASTATIN 20 MG
20 TABLET ORAL NIGHTLY
Status: DISCONTINUED | OUTPATIENT
Start: 2019-01-01 | End: 2019-01-01 | Stop reason: ALTCHOICE

## 2019-01-01 RX ORDER — ACETAMINOPHEN 325 MG/1
650 TABLET ORAL EVERY 6 HOURS PRN
Status: DISCONTINUED | OUTPATIENT
Start: 2019-01-01 | End: 2019-01-01 | Stop reason: HOSPADM

## 2019-01-01 RX ORDER — DIPHENHYDRAMINE HYDROCHLORIDE, ZINC ACETATE 2; .1 G/100G; G/100G
CREAM TOPICAL 3 TIMES DAILY PRN
Status: CANCELLED | OUTPATIENT
Start: 2019-01-01

## 2019-01-01 RX ORDER — ASPIRIN 81 MG/1
324 TABLET, CHEWABLE ORAL ONCE
Status: COMPLETED | OUTPATIENT
Start: 2019-01-01 | End: 2019-01-01

## 2019-01-01 RX ORDER — MORPHINE SULFATE/PF 50 MG/50ML
PATIENT CONTROLLED ANALGESIA SYRINGE INTRAVENOUS CONTINUOUS
Status: CANCELLED | OUTPATIENT
Start: 2019-01-01

## 2019-01-01 RX ORDER — GLYCOPYRROLATE 1 MG/5 ML
0.2 SYRINGE (ML) INTRAVENOUS
Status: DISCONTINUED | OUTPATIENT
Start: 2019-01-01 | End: 2019-09-16 | Stop reason: HOSPADM

## 2019-01-01 RX ORDER — LORAZEPAM 2 MG/ML
0.5 INJECTION INTRAMUSCULAR
Status: CANCELLED | OUTPATIENT
Start: 2019-01-01

## 2019-01-01 RX ORDER — GLYCOPYRROLATE 0.2 MG/ML
0.2 INJECTION INTRAMUSCULAR; INTRAVENOUS
Status: DISCONTINUED | OUTPATIENT
Start: 2019-01-01 | End: 2019-01-01 | Stop reason: CLARIF

## 2019-01-01 RX ORDER — LOSARTAN POTASSIUM 50 MG/1
50 TABLET ORAL DAILY
Status: DISCONTINUED | OUTPATIENT
Start: 2019-01-01 | End: 2019-01-01

## 2019-01-01 RX ORDER — HEPARIN SODIUM 1000 [USP'U]/ML
60 INJECTION, SOLUTION INTRAVENOUS; SUBCUTANEOUS PRN
Status: DISCONTINUED | OUTPATIENT
Start: 2019-01-01 | End: 2019-01-01 | Stop reason: ALTCHOICE

## 2019-01-01 RX ORDER — LORAZEPAM 2 MG/ML
0.5 INJECTION INTRAMUSCULAR
Status: DISCONTINUED | OUTPATIENT
Start: 2019-01-01 | End: 2019-01-01 | Stop reason: HOSPADM

## 2019-01-01 RX ORDER — ASPIRIN 81 MG/1
81 TABLET, CHEWABLE ORAL DAILY
Status: DISCONTINUED | OUTPATIENT
Start: 2019-01-01 | End: 2019-01-01

## 2019-01-01 RX ORDER — HEPARIN SODIUM 1000 [USP'U]/ML
30 INJECTION, SOLUTION INTRAVENOUS; SUBCUTANEOUS PRN
Status: DISCONTINUED | OUTPATIENT
Start: 2019-01-01 | End: 2019-01-01 | Stop reason: ALTCHOICE

## 2019-01-01 RX ORDER — MORPHINE SULFATE 2 MG/ML
2 INJECTION, SOLUTION INTRAMUSCULAR; INTRAVENOUS
Status: DISCONTINUED | OUTPATIENT
Start: 2019-01-01 | End: 2019-01-01

## 2019-01-01 RX ADMIN — SODIUM POLYSTYRENE SULFONATE 30 G: 15 SUSPENSION ORAL; RECTAL at 13:31

## 2019-01-01 RX ADMIN — MORPHINE SULFATE 1 MG: 2 INJECTION, SOLUTION INTRAMUSCULAR; INTRAVENOUS at 09:10

## 2019-01-01 RX ADMIN — MORPHINE SULFATE 2 MG: 2 INJECTION, SOLUTION INTRAMUSCULAR; INTRAVENOUS at 08:03

## 2019-01-01 RX ADMIN — LORAZEPAM 1 MG: 2 INJECTION INTRAMUSCULAR; INTRAVENOUS at 20:08

## 2019-01-01 RX ADMIN — LORAZEPAM 0.5 MG: 2 INJECTION INTRAMUSCULAR; INTRAVENOUS at 22:20

## 2019-01-01 RX ADMIN — LORAZEPAM 0.5 MG: 2 INJECTION INTRAMUSCULAR; INTRAVENOUS at 07:22

## 2019-01-01 RX ADMIN — LORAZEPAM 0.5 MG: 2 INJECTION INTRAMUSCULAR; INTRAVENOUS at 00:44

## 2019-01-01 RX ADMIN — Medication 10 ML: at 17:26

## 2019-01-01 RX ADMIN — MORPHINE SULFATE 30 MG: 1 INJECTION INTRAVENOUS at 19:56

## 2019-01-01 RX ADMIN — INSULIN LISPRO 4 UNITS: 100 INJECTION, SOLUTION INTRAVENOUS; SUBCUTANEOUS at 12:48

## 2019-01-01 RX ADMIN — INSULIN LISPRO 2 UNITS: 100 INJECTION, SOLUTION INTRAVENOUS; SUBCUTANEOUS at 20:34

## 2019-01-01 RX ADMIN — SODIUM BICARBONATE: 84 INJECTION, SOLUTION INTRAVENOUS at 17:24

## 2019-01-01 RX ADMIN — MORPHINE SULFATE 2 MG: 2 INJECTION, SOLUTION INTRAMUSCULAR; INTRAVENOUS at 22:15

## 2019-01-01 RX ADMIN — ASPIRIN 81 MG 81 MG: 81 TABLET ORAL at 09:29

## 2019-01-01 RX ADMIN — TICAGRELOR 90 MG: 90 TABLET ORAL at 08:32

## 2019-01-01 RX ADMIN — DIPHENHYDRAMINE HYDROCHLORIDE 25 MG: 50 INJECTION, SOLUTION INTRAMUSCULAR; INTRAVENOUS at 18:54

## 2019-01-01 RX ADMIN — MORPHINE SULFATE 2 MG: 2 INJECTION, SOLUTION INTRAMUSCULAR; INTRAVENOUS at 07:22

## 2019-01-01 RX ADMIN — ASPIRIN 81 MG 324 MG: 81 TABLET ORAL at 23:43

## 2019-01-01 RX ADMIN — MORPHINE SULFATE 1 MG: 2 INJECTION, SOLUTION INTRAMUSCULAR; INTRAVENOUS at 03:13

## 2019-01-01 RX ADMIN — ACETAMINOPHEN 650 MG: 325 TABLET ORAL at 15:54

## 2019-01-01 RX ADMIN — SODIUM BICARBONATE: 84 INJECTION, SOLUTION INTRAVENOUS at 09:47

## 2019-01-01 RX ADMIN — MORPHINE SULFATE 1 MG: 2 INJECTION, SOLUTION INTRAMUSCULAR; INTRAVENOUS at 02:11

## 2019-01-01 RX ADMIN — HEPARIN SODIUM 15 UNITS/KG/HR: 10000 INJECTION, SOLUTION INTRAVENOUS at 08:36

## 2019-01-01 RX ADMIN — Medication 10 ML: at 08:49

## 2019-01-01 RX ADMIN — MORPHINE SULFATE: 1 INJECTION INTRAVENOUS at 08:50

## 2019-01-01 RX ADMIN — MORPHINE SULFATE 2 MG: 2 INJECTION, SOLUTION INTRAMUSCULAR; INTRAVENOUS at 15:08

## 2019-01-01 RX ADMIN — LORAZEPAM 0.5 MG: 2 INJECTION INTRAMUSCULAR; INTRAVENOUS at 17:50

## 2019-01-01 RX ADMIN — LORAZEPAM 0.5 MG: 2 INJECTION INTRAMUSCULAR; INTRAVENOUS at 12:31

## 2019-01-01 RX ADMIN — Medication 10 ML: at 19:40

## 2019-01-01 RX ADMIN — Medication 10 ML: at 16:22

## 2019-01-01 RX ADMIN — MIDODRINE HYDROCHLORIDE 10 MG: 10 TABLET ORAL at 08:33

## 2019-01-01 RX ADMIN — MORPHINE SULFATE 1 MG: 2 INJECTION, SOLUTION INTRAMUSCULAR; INTRAVENOUS at 19:40

## 2019-01-01 RX ADMIN — DIPHENHYDRAMINE HYDROCHLORIDE, ZINC ACETATE: 2; .1 CREAM TOPICAL at 19:29

## 2019-01-01 RX ADMIN — Medication 10 ML: at 20:35

## 2019-01-01 RX ADMIN — LORAZEPAM 0.5 MG: 2 INJECTION INTRAMUSCULAR; INTRAVENOUS at 15:44

## 2019-01-01 RX ADMIN — ACETAMINOPHEN 650 MG: 325 TABLET ORAL at 11:54

## 2019-01-01 RX ADMIN — LORAZEPAM 0.5 MG: 2 INJECTION INTRAMUSCULAR; INTRAVENOUS at 21:20

## 2019-01-01 RX ADMIN — HEPARIN SODIUM 12 UNITS/KG/HR: 10000 INJECTION, SOLUTION INTRAVENOUS at 00:14

## 2019-01-01 RX ADMIN — HEPARIN SODIUM 3670 UNITS: 1000 INJECTION, SOLUTION INTRAVENOUS; SUBCUTANEOUS at 00:13

## 2019-01-01 RX ADMIN — INSULIN LISPRO 2 UNITS: 100 INJECTION, SOLUTION INTRAVENOUS; SUBCUTANEOUS at 17:26

## 2019-01-01 RX ADMIN — Medication 50 MEQ: at 17:25

## 2019-01-01 RX ADMIN — MORPHINE SULFATE 2 MG: 2 INJECTION, SOLUTION INTRAMUSCULAR; INTRAVENOUS at 21:00

## 2019-01-01 RX ADMIN — MORPHINE SULFATE 2 MG: 2 INJECTION, SOLUTION INTRAMUSCULAR; INTRAVENOUS at 23:25

## 2019-01-01 RX ADMIN — ACETAMINOPHEN 650 MG: 325 TABLET ORAL at 00:13

## 2019-01-01 RX ADMIN — ASPIRIN 81 MG 81 MG: 81 TABLET ORAL at 08:49

## 2019-01-01 RX ADMIN — Medication 10 ML: at 08:33

## 2019-01-01 RX ADMIN — INSULIN LISPRO 4 UNITS: 100 INJECTION, SOLUTION INTRAVENOUS; SUBCUTANEOUS at 18:09

## 2019-01-01 RX ADMIN — MORPHINE SULFATE 2 MG: 2 INJECTION, SOLUTION INTRAMUSCULAR; INTRAVENOUS at 00:34

## 2019-01-01 RX ADMIN — MIDODRINE HYDROCHLORIDE 10 MG: 10 TABLET ORAL at 20:57

## 2019-01-01 RX ADMIN — MORPHINE SULFATE 1 MG: 2 INJECTION, SOLUTION INTRAMUSCULAR; INTRAVENOUS at 11:30

## 2019-01-01 RX ADMIN — ACETAMINOPHEN 650 MG: 325 TABLET ORAL at 08:39

## 2019-01-01 RX ADMIN — INSULIN LISPRO 10 UNITS: 100 INJECTION, SOLUTION INTRAVENOUS; SUBCUTANEOUS at 13:33

## 2019-01-01 RX ADMIN — MORPHINE SULFATE 1 MG: 2 INJECTION, SOLUTION INTRAMUSCULAR; INTRAVENOUS at 19:13

## 2019-01-01 RX ADMIN — ASPIRIN 81 MG 81 MG: 81 TABLET ORAL at 08:32

## 2019-01-01 RX ADMIN — MORPHINE SULFATE 1 MG: 2 INJECTION, SOLUTION INTRAMUSCULAR; INTRAVENOUS at 20:22

## 2019-01-01 RX ADMIN — ATORVASTATIN CALCIUM 10 MG: 10 TABLET, FILM COATED ORAL at 21:53

## 2019-01-01 RX ADMIN — SODIUM CHLORIDE 500 ML: 9 INJECTION, SOLUTION INTRAVENOUS at 01:50

## 2019-01-01 RX ADMIN — TICAGRELOR 90 MG: 90 TABLET ORAL at 21:53

## 2019-01-01 RX ADMIN — SODIUM CHLORIDE 250 ML: 9 INJECTION, SOLUTION INTRAVENOUS at 16:23

## 2019-01-01 RX ADMIN — INSULIN LISPRO 2 UNITS: 100 INJECTION, SOLUTION INTRAVENOUS; SUBCUTANEOUS at 21:05

## 2019-01-01 RX ADMIN — ONDANSETRON 4 MG: 2 INJECTION INTRAMUSCULAR; INTRAVENOUS at 21:53

## 2019-01-01 RX ADMIN — LORAZEPAM 0.5 MG: 2 INJECTION, SOLUTION INTRAMUSCULAR; INTRAVENOUS at 16:03

## 2019-01-01 RX ADMIN — LEVOTHYROXINE SODIUM 112 MCG: 112 TABLET ORAL at 06:38

## 2019-01-01 RX ADMIN — INSULIN LISPRO 4 UNITS: 100 INJECTION, SOLUTION INTRAVENOUS; SUBCUTANEOUS at 08:34

## 2019-01-01 RX ADMIN — SODIUM CHLORIDE: 9 INJECTION, SOLUTION INTRAVENOUS at 04:47

## 2019-01-01 RX ADMIN — LEVOTHYROXINE SODIUM 112 MCG: 112 TABLET ORAL at 09:28

## 2019-01-01 RX ADMIN — LORAZEPAM 0.5 MG: 2 INJECTION INTRAMUSCULAR; INTRAVENOUS at 12:56

## 2019-01-01 RX ADMIN — LORAZEPAM 0.5 MG: 2 INJECTION INTRAMUSCULAR; INTRAVENOUS at 21:15

## 2019-01-01 RX ADMIN — HEPARIN SODIUM 1840 UNITS: 1000 INJECTION INTRAVENOUS; SUBCUTANEOUS at 04:23

## 2019-01-01 RX ADMIN — DIPHENHYDRAMINE HYDROCHLORIDE, ZINC ACETATE: 2; .1 CREAM TOPICAL at 12:38

## 2019-01-01 RX ADMIN — MORPHINE SULFATE 2 MG: 2 INJECTION, SOLUTION INTRAMUSCULAR; INTRAVENOUS at 15:48

## 2019-01-01 RX ADMIN — LORAZEPAM 0.5 MG: 2 INJECTION INTRAMUSCULAR; INTRAVENOUS at 06:23

## 2019-01-01 RX ADMIN — ATORVASTATIN CALCIUM 10 MG: 10 TABLET, FILM COATED ORAL at 20:34

## 2019-01-01 RX ADMIN — MORPHINE SULFATE 2 MG: 2 INJECTION, SOLUTION INTRAMUSCULAR; INTRAVENOUS at 09:54

## 2019-01-01 RX ADMIN — LORAZEPAM 0.5 MG: 2 INJECTION INTRAMUSCULAR; INTRAVENOUS at 10:34

## 2019-01-01 RX ADMIN — SODIUM CHLORIDE 500 ML: 9 INJECTION, SOLUTION INTRAVENOUS at 23:40

## 2019-01-01 RX ADMIN — LORAZEPAM 0.5 MG: 2 INJECTION INTRAMUSCULAR; INTRAVENOUS at 09:01

## 2019-01-01 ASSESSMENT — PAIN DESCRIPTION - ORIENTATION
ORIENTATION: LEFT
ORIENTATION: OTHER (COMMENT)
ORIENTATION: OTHER (COMMENT)
ORIENTATION: LEFT

## 2019-01-01 ASSESSMENT — PAIN - FUNCTIONAL ASSESSMENT
PAIN_FUNCTIONAL_ASSESSMENT: ACTIVITIES ARE NOT PREVENTED
PAIN_FUNCTIONAL_ASSESSMENT: PREVENTS OR INTERFERES SOME ACTIVE ACTIVITIES AND ADLS

## 2019-01-01 ASSESSMENT — PAIN SCALES - GENERAL
PAINLEVEL_OUTOF10: 0
PAINLEVEL_OUTOF10: 3
PAINLEVEL_OUTOF10: 0
PAINLEVEL_OUTOF10: 3
PAINLEVEL_OUTOF10: 0
PAINLEVEL_OUTOF10: 0
PAINLEVEL_OUTOF10: 6
PAINLEVEL_OUTOF10: 0
PAINLEVEL_OUTOF10: 10
PAINLEVEL_OUTOF10: 0
PAINLEVEL_OUTOF10: 6
PAINLEVEL_OUTOF10: 6
PAINLEVEL_OUTOF10: 0
PAINLEVEL_OUTOF10: 0
PAINLEVEL_OUTOF10: 4
PAINLEVEL_OUTOF10: 0
PAINLEVEL_OUTOF10: 0
PAINLEVEL_OUTOF10: 5
PAINLEVEL_OUTOF10: 3
PAINLEVEL_OUTOF10: 10
PAINLEVEL_OUTOF10: 0
PAINLEVEL_OUTOF10: 6
PAINLEVEL_OUTOF10: 0
PAINLEVEL_OUTOF10: 1
PAINLEVEL_OUTOF10: 10
PAINLEVEL_OUTOF10: 3
PAINLEVEL_OUTOF10: 4
PAINLEVEL_OUTOF10: 0
PAINLEVEL_OUTOF10: 6
PAINLEVEL_OUTOF10: 0
PAINLEVEL_OUTOF10: 3
PAINLEVEL_OUTOF10: 0
PAINLEVEL_OUTOF10: 4

## 2019-01-01 ASSESSMENT — PAIN DESCRIPTION - DESCRIPTORS
DESCRIPTORS: ACHING
DESCRIPTORS: ACHING
DESCRIPTORS: ACHING;CONSTANT
DESCRIPTORS: ACHING
DESCRIPTORS: DISCOMFORT
DESCRIPTORS: PRESSURE
DESCRIPTORS: DISCOMFORT
DESCRIPTORS: ACHING
DESCRIPTORS: ACHING

## 2019-01-01 ASSESSMENT — PAIN DESCRIPTION - FREQUENCY
FREQUENCY: CONTINUOUS
FREQUENCY: INTERMITTENT
FREQUENCY: CONTINUOUS
FREQUENCY: CONTINUOUS

## 2019-01-01 ASSESSMENT — ENCOUNTER SYMPTOMS
PHOTOPHOBIA: 0
SHORTNESS OF BREATH: 0
EYE DISCHARGE: 0
BACK PAIN: 0
CHOKING: 0
TROUBLE SWALLOWING: 0
EYE ITCHING: 0
ABDOMINAL DISTENTION: 0
COUGH: 0
BLOOD IN STOOL: 0
NAUSEA: 0
ABDOMINAL PAIN: 0
SORE THROAT: 0
EYE PAIN: 0
VOMITING: 0
VOICE CHANGE: 0
CHEST TIGHTNESS: 0
EYE REDNESS: 0
WHEEZING: 0
CONSTIPATION: 0
SINUS PRESSURE: 0
RHINORRHEA: 0
DIARRHEA: 0

## 2019-01-01 ASSESSMENT — PAIN DESCRIPTION - LOCATION
LOCATION: HEAD
LOCATION: LEG
LOCATION: GENERALIZED
LOCATION: LEG

## 2019-01-01 ASSESSMENT — PAIN DESCRIPTION - PAIN TYPE
TYPE: CHRONIC PAIN
TYPE: ACUTE PAIN;CHRONIC PAIN
TYPE: CHRONIC PAIN
TYPE: ACUTE PAIN
TYPE: CHRONIC PAIN

## 2019-01-01 ASSESSMENT — PAIN DESCRIPTION - PROGRESSION
CLINICAL_PROGRESSION: NOT CHANGED

## 2019-01-01 ASSESSMENT — PAIN DESCRIPTION - DIRECTION
RADIATING_TOWARDS: ALL OVER
RADIATING_TOWARDS: ALL OVER

## 2019-01-01 ASSESSMENT — PAIN DESCRIPTION - ONSET
ONSET: ON-GOING
ONSET: SUDDEN

## 2019-01-01 ASSESSMENT — PAIN SCALES - WONG BAKER: WONGBAKER_NUMERICALRESPONSE: 6

## 2019-09-09 PROBLEM — I21.4 NSTEMI (NON-ST ELEVATED MYOCARDIAL INFARCTION) (HCC): Status: ACTIVE | Noted: 2019-01-01

## 2019-09-10 NOTE — ED NOTES
Pt resting in bed. VS reassessed. Call light in reach. Respirations regular. Pt appears anxious.       Brit Nava RN  09/10/19 3923

## 2019-09-10 NOTE — CONSULTS
currently hesitate due to the risk of HD  Renal evaluation to discuss risks  Beta blockers to decrease HR, monitor for hypotension  Continue Aspirin and Statin  2D Echo to evaluate LVSF/WMA  Further workup based on clinical course and patient/family wishes         Please do note hesitate to contact me for any further questions. Thank you for the opportunity to be involved in this patient's care.     Code Status: Limited    Electronically signed by Gaylyn Cockayne, MD on 9/10/2019 at 5:23 PM

## 2019-09-10 NOTE — ED TRIAGE NOTES
Pt comes to ED c/o fatigue and weakness for a couple of days. Pt states she is having left leg pain. Family states that this is a chronic issue and that she would need a hip replacement. Pt states that this all started when she was working outside.

## 2019-09-10 NOTE — ED NOTES
Patient transported to [facility]  by cart in stable condition. Patient monitored on cardiac telemetry. Patient on2  LPM O2 via nasal canula. IV infusing and line is patent.         Ophelia Brown, EMT-P  09/10/19 0131

## 2019-09-10 NOTE — CARE COORDINATION
[address] Transitions Interview     9/10/2019    Patient: Bertrand Hammans Patient : 1934   MRN: [id number]  Reason for Admission:   RARS: Readmission Risk Score: 9         Introduced self/role. Explained BPCI-A program and beneficiary letter. Patient verbalized understandment. Readmission Risk  Patient Active Problem List   Diagnosis    Hip arthritis    Diabetes mellitus ([facility]    Anemia associated with acute blood loss    Hyperlipidemia    NSTEMI (non-ST elevated myocardial infarction) [facility])       Inpatient Assessment  Care Transitions Summary    Care Transitions Inpatient Review  Medication Review  Are you able to afford your medications?:  Yes  How often do you have difficulty taking your medications?:  I always take them as prescribed. Housing Review  Who do you live with?:  Alone  Are you an active caregiver in your home?:  No  Social Support  Do you have a ?:  No  Do you have a [facility]?:  No  Durable Medical Equipment  Functional Review  Ability to seek help/take action for Emergent/Urgent situations i.e. fire, crime, inclement weather or health crisis. :  Independent  Ability handle personal hygiene needs (bathing/dressing/grooming): Independent  Ability to manage medications: Independent  Ability to prepare food:  Independent  Ability to maintain home (clean home, laundry): Independent  Ability to drive and/or has transportation:  Independent  Ability to do shopping:  Independent  Ability to manage finances:   Independent  Is patient able to live independently?:  No  Hearing and Vision  Care Transitions Interventions  No Identified Needs         Follow Up  Future Appointments   Date Time Provider Ronal Lee   9/10/2019  4:00 PM STR CARDIAC CATHETERIZATION [address] CATH [facility] [address] Maintenance  Health Maintenance Due   Topic Date Due    DEXA (modify frequency per FRAX score)  1999       Enriqueta Aguirre RN

## 2019-09-10 NOTE — ED NOTES
Braden, charge RN spoke with Dr. Holly Dockery about admitting pt to ICU instead of step down due to increased troponin and low BP. Braden, charge RN informed Dr. Holly Dockery no beds were available tonight on step down. Dr. Holly Dockery stated pt did not need an ICU bed.       Sukhjinder Fields RN  09/10/19 2404

## 2019-09-10 NOTE — FLOWSHEET NOTE
09/10/19 1121   Provider Notification   Reason for Communication Critical Value (comment)   Provider Name [place]   Provider Notification Physician   Method of Communication Secure Message   Response Other (Comment)  (will put orders in)   Notification Time 1121   Shift Event Other (comment)  (potassim level 6.0, creatinine 2.4)

## 2019-09-10 NOTE — CONSULTS
last several days. As stated above, she does report some  pain in her hip. Symptoms are worse with minimal exertion. She also  has had some shortness of breath per family members. It is also  reported to me that the patient's blood pressure was low. In the  emergency room yesterday, her blood pressure was in systolic 40G. Echocardiogram done earlier this morning now shows ejection fraction of  20% to 25% with mild-to-moderate pulmonary hypertension. PAST MEDICAL HISTORY:  Significant for diabetes, hypertension,  arthritis, thyroid disorder, and chronic kidney disease, stage 3. PAST SURGICAL HISTORY:  Significant for D and C, hip replacement. FAMILY HISTORY:  Significant for diabetes, high blood pressure, stroke. SOCIAL HISTORY:  No alcohol, smoking or illicit drug abuse. HOME MEDICATIONS:  Include Janumet, Cozaar, Lipitor and Synthroid. REVIEW OF SYSTEMS:  Positive for generalized weakness associated with  some shortness of breath on exertion, poor oral intake. Denies any  fever or chills. No headaches, no blurry vision. Denies any cough. Denies any vomiting or diarrhea. No dysuria or any gross hematuria. Denies any lower extremity swelling. No numbness or any tingling. No  palpitations. All other review of systems were reviewed and negative. CURRENT MEDICATIONS:  Include aspirin, levothyroxine, metoprolol,  simvastatin. ALLERGIES:  The patient does not have any known allergies. PHYSICAL EXAMINATION:  VITAL SIGNS:  Blood pressure is 96/58 with a heart rate of 106,  temperature is 97.4, respiratory rate is 18, pulse oximetry is 95%. GENERAL:  On examination, she is an elderly, frail-appearing female who  appears to be deconditioned. Does not appear to be in any acute  distress. HEENT:  Reveals normal conjunctivae without any icteric sclerae. Oral  cavity appears dry. NECK:  Supple without any JVD. Good range of motion.   LUNGS:  Air entry bilaterally without any M.D.    D: 09/10/2019 15:23:02       T: 09/10/2019 15:28:03     [email or web address]  Job#: [id number]     Doc#: [id number]    CC:

## 2019-09-10 NOTE — H&P
technology. **      Final report electronically signed by Dr. Tara Ardon on 9/9/2019 11:31 PM      XR CHEST 1 VW   Final Result   . Suspected chronic lung changes with minimal left base scarring of excluded. No other active pathology of the chest suspected. **This report has been created using voice recognition software. It may contain minor errors which are inherent in voice recognition technology. **      Final report electronically signed by Dr. Tara Ardon on 9/9/2019 11:24 PM      XR HIP 2-3 VW W PELVIS LEFT   Final Result   1. Chronic advanced degenerative arthropathy of the left hip without fracture or dislocation. 2. Postsurgical changes of right hip arthroplasty. Final report electronically signed by Dr. Tara Ardon on 9/9/2019 11:23 PM           DVT prophylaxis: Already on Anticoagulation    Code Status: Prior      Disposition:Rehab    Active Hospital Problems    Diagnosis Date Noted    [facility] [I21.4] 09/09/2019    Hyperlipidemia [E78.5] 09/05/2012    Diabetes [facility] [E11.9] 09/05/2012       PLAN:    1. NSTEMI - patient has no prior cardiac history. NPO, probable cardiac cath in AM.  Patient on heparin drip. Follow troponin. Echocardiogram, cardiology consult. Nitro PRN vs Nitro drip. Patient has hypotension. Watch for low BP.  2. DM2 - sliding scale insulin. Thank you Johnna Del Angel MD for the opportunity to be involved in this patient's care.     Electronically signed by Helder Christensen DO on 9/10/2019 at 12:13 AM

## 2019-09-10 NOTE — CARE COORDINATION
9/10/19, 2:13 PM      [address]       Admitted from: ED 9/9/2019/ 2204 Hospital day: 1   Location: -06/006-A Reason for admit: NSTEMI (non-ST elevated myocardial infarction) ([facility] [I21.4] Status: IP  Admit order signed?: yes  PMH:  has a past medical history of Anesthesia, Arthritis, Diabetes mellitus ([facility] Hyperlipidemia, Hypertension, Nausea & vomiting, and Thyroid disease. Procedure: 9/10 ECHO EF 20-25%  Pertinent abnormal Imaging:none  Medications:  Scheduled Meds:   aspirin  81 mg Oral Daily    levothyroxine  112 mcg Oral Daily    sodium chloride flush  10 mL Intravenous 2 times per day    metoprolol tartrate  12.5 mg Oral BID    simvastatin  20 mg Oral Nightly    insulin lispro  0-12 Units Subcutaneous TID WC    insulin lispro  0-6 Units Subcutaneous Nightly     Continuous Infusions:   sodium chloride 75 mL/hr at 09/10/19 0447    dextrose      heparin (porcine) 12 Units/kg/hr (09/10/19 0800)      Pertinent Info/Orders/Treatment Plan:  IVF/Heparin gtt continued. K+ 6, Creatinine 2.4 (1.8) worse, elevated Troponin/BNP/LFT/s; monitor. Nephrology following for will need optimization prior to Cardiac Cath. Cardiology following for NSTEMI  Diet: DIET CARB CONTROL; Diet NPO, After Midnight Exceptions are: Sips with Meds   Smoking status:  reports that she has never smoked. She does not have any smokeless tobacco history on file.    PCP: Archie Degroot MD  Readmission: none  Readmission Risk Score: 12%    Discharge Planning  Current Residence:  Private Residence  Living Arrangements:  Spouse/Significant Other   Support Systems:  Family Members, Spouse/Significant Other, Children  Current Services PTA:     Potential Assistance Needed:  N/A  Potential Assistance Purchasing Medications:  Yes  Does patient want to participate in local refill/ meds to beds program?  No  Type of Home Care Services:  None  Patient expects to be discharged to:  Home with   Expected Discharge date:

## 2019-09-10 NOTE — ED PROVIDER NOTES
[facility]  eMERGENCY dEPARTMENT eNCOUnter          CHIEF COMPLAINT       Chief Complaint   Patient presents with    Fatigue       Nurses Notes reviewed and I agree except as noted in the HPI. HISTORY OF PRESENT ILLNESS    Zaki Crockett is a 80 y.o. female who presents with generalized weakness. Patient states that she has been feeling good over the last several days. Patient states that she has significant pain in her left hip and her left knee. This is osteoarthritis. Currently she states she is able to ambulate however she feels like she cannot control herself. Patient is a diabetic. Blood sugars 318. At bedside the patient is able to move all her extremities without any difficulty. She is not complaining of a headache. Her pain score in her hip and knee is a 2 out of 10 worsened with ambulation. Patient does have some significant anxiety. She cannot really explain to me what is going on. Currently the patient is resting comfortably on the cot no apparent distress. INITIAL NIH STROKE SCALE    Time Performed: At presentation    1a. Level of consciousness:  0 - alert; keenly responsive  1b. Level of consciousness questions:  0 - answers both questions correctly  1c. Level of consciousness questions:  0 - performs both tasks correctly  2. Best Gaze:  0 - normal  3. Visual:  0 - no visual loss  4. Facial Palsy:  0 - normal symmetric movement  5a. Motor left arm:  0 - no drift, limb holds 90 (or 45) degrees for full 10 seconds  5b. Motor right arm:  0 - no drift, limb holds 90 (or 45) degrees for full 10 seconds  6a. Motor left le - no drift; leg holds 30 degree position for full 5 seconds  6b. Motor right le - no drift; leg holds 30 degree position for full 5 seconds  7. Limb Ataxia:  0 - absent  8. Sensory:  0 - normal; no sensory loss  9. Best Language:  0 - no aphasia, normal  10. Dysarthria:  0 - normal  11.   Extinction and these medications which have NOT CHANGED    Details   ibuprofen (ADVIL) 200 MG tablet Take 2 tablets by mouth every 6 hours as needed for Pain  Qty: 120 tablet, Refills: 3      levothyroxine (SYNTHROID) 112 MCG tablet Take 112 mcg by mouth Daily      aspirin 81 MG tablet Take 81 mg by mouth daily      metformin (GLUCOPHAGE) 500 MG tablet Take 500 mg by mouth 2 times daily (with meals). 2 tab   Indications: diabetes      glimepiride (AMARYL) 1 MG tablet Take 1 mg by mouth every morning (before breakfast). Doesn't take routinely only when sugar is high   Indications: diabetes      simvastatin (ZOCOR) 20 MG tablet Take 20 mg by mouth daily. Indications: cholesterol      losartan (COZAAR) 50 MG tablet Take 50 mg by mouth daily. Indications: blood pressure             ALLERGIES     has No Known Allergies. FAMILY HISTORY     She indicated that the status of her mother is unknown. She indicated that the status of her father is unknown. She indicated that the status of her sister is unknown. She indicated that the status of her maternal grandmother is unknown. She indicated that the status of her maternal aunt is unknown.   family history includes Cancer in her brother and sister; Diabetes in her brother, brother, maternal aunt, and maternal grandmother; Heart Disease in her father; High Blood Pressure in her mother; Stroke in her mother. SOCIAL HISTORY      reports that she has never smoked. She does not have any smokeless tobacco history on file. She reports that she does not drink alcohol or use drugs. PHYSICAL EXAM     INITIAL VITALS:  height is 5' 2\" (1.575 m) and weight is 133 lb 14.4 oz (60.7 kg). Her axillary temperature is 97 °F (36.1 °C). Her blood pressure is 95/52 (abnormal) and her pulse is 100. Her respiration is 22 and oxygen saturation is 98%. Physical Exam   Constitutional: She is oriented to person, place, and time. She appears well-developed and well-nourished. No distress.    HENT: Head: Normocephalic and atraumatic. Right Ear: Hearing, tympanic membrane, external ear and ear canal normal. No hemotympanum. No decreased hearing is noted. Left Ear: Hearing, tympanic membrane, external ear and ear canal normal. No hemotympanum. No decreased hearing is noted. Nose: Nose normal.   Mouth/Throat: Uvula is midline, oropharynx is clear and moist and mucous membranes are normal. No oropharyngeal exudate. Eyes: Pupils are equal, round, and reactive to light. Conjunctivae and EOM are normal. Right eye exhibits no discharge. Left eye exhibits no discharge. No scleral icterus. Fundoscopic exam:       The right eye shows no exudate, no hemorrhage and no papilledema. The left eye shows no exudate, no hemorrhage and no papilledema. Neck: Trachea normal, normal range of motion and full passive range of motion without pain. Neck supple. No hepatojugular reflux and no JVD present. No spinous process tenderness and no muscular tenderness present. Carotid bruit is not present. No neck rigidity. No tracheal deviation and normal range of motion present. No Brudzinski's sign and no Kernig's sign noted. No thyroid mass present. Cardiovascular: Normal rate, regular rhythm, normal heart sounds and intact distal pulses. Exam reveals no gallop and no friction rub. No murmur heard. Pulmonary/Chest: Effort normal and breath sounds normal. She has no wheezes. She has no rales. Abdominal: Soft. Bowel sounds are normal. She exhibits no mass. There is no tenderness. There is no rebound and no guarding. Musculoskeletal: Normal range of motion. She exhibits no edema or tenderness. Lymphadenopathy:     She has no cervical adenopathy. Neurological: She is alert and oriented to person, place, and time. She has normal strength and normal reflexes. She displays normal reflexes. No cranial nerve deficit or sensory deficit. She exhibits normal muscle tone.  Gait abnormal. Coordination normal. GCS eye subscore is 4. GCS verbal subscore is 5. GCS motor subscore is 6. Reflex Scores:       Tricep reflexes are 2+ on the right side and 2+ on the left side. Bicep reflexes are 2+ on the right side and 2+ on the left side. Brachioradialis reflexes are 2+ on the right side and 2+ on the left side. Patellar reflexes are 2+ on the right side and 2+ on the left side. Achilles reflexes are 2+ on the right side and 2+ on the left side. Patient has a slightly ataxic gait, this is normal for her according to family. Skin: Skin is warm and dry. No rash noted. She is not diaphoretic. Psychiatric: She has a normal mood and affect. Her behavior is normal. Judgment and thought content normal.   Nursing note and vitals reviewed. DIFFERENTIAL DIAGNOSIS:   Frontal diagnosis discussed extensively at bedside with the patient and family including but not limited to infection, metabolic encephalopathy, renal failure, AMI, stroke. DIAGNOSTIC RESULTS     EKG: All EKG's are interpreted by the Emergency Department Physician who either signs or Co-signs this chart in the absence of a cardiologist.  EKG revealed sinus tachycardia with ventricular rate of 104 right bundle branch block, slight right axis deviation, WY interval 178 QRS duration of 164 QT interval 376 QTC of 494. When compared with EKG dated September 21, 2016 we have T wave inversion in the precordial leads V1 through V6.    RADIOLOGY: non-plain film images(s) such as CT, Ultrasound and MRI are read by the radiologist.  [address] [place] [address]   Final Result   1. Stable cerebral atrophy changes. 2. Negative exam for active appearing pathology. 3. Chronic appearing changes of the left maxillary sinus discussed above. **This report has been created using voice recognition software. It may contain minor errors which are inherent in voice recognition technology. **      Final report electronically signed by Dr. Peggy Quinteroelor on

## 2019-09-11 NOTE — PROGRESS NOTES
Limited    Assessment/Plan:    Anticipated Discharge in : TBD     Active Hospital Problems    Diagnosis Date Noted    DIAZ (acute kidney injury) ([facility] [N17.9]     Hyperkalemia [G80.2]     Metabolic acidosis [H54.7]     CKD (chronic kidney disease), stage III ([facility] [W71.6]     Systolic dysfunction, left ventricle [I51.9]     Other hypotension [I95.89]     NSTEMI (non-ST elevated myocardial infarction) ([facility] [I21.4] 09/09/2019    Hyperlipidemia [E78.5] 09/05/2012    Diabetes mellitus ([facility] [E11.9] 09/05/2012       Assessment/Plan:     1. NSTEMI: no prior cardiac history. Elevated troponin. ECG changes with ischemia. Cont ASA, Statin, BB and heparin drip. Detailed discussion regarding risks of undergoing LHC and it causing pt to go on dialysis given DIAZ. Multidisciplinary discussion with family, Cardiology and Nephrology. Plan to hold off on LHC for now. Will cont to monitor kidney function. 2. DIAZ on CKD: baseline Cr 1.0-1.2, currently at 2.2. Stop Cozaar. IV fluids. Consult nephrology. Strict I/Os. Daily weights. 3. Essential HTN. Hold Cozaar given DIAZ. Monitor BP daily. 4. New Systolic CHF Diagnosis. 2D Echo showing EF 20% with severe global hypokinesis of LV. No evidence of fluid overload.           Electronically signed by Zahida Bella MD on 9/11/2019 at 2:52 PM

## 2019-09-11 NOTE — PROGRESS NOTES
* 343*   * 239*   BILITOT 0.6 0.5   ALKPHOS 77 61         Meds:  Infusion:    dextrose      sodium bicarbonate infusion 75 mL/hr at 09/11/19 0947    heparin (porcine) 13 Units/kg/hr (09/11/19 0959)     Meds:    aspirin  81 mg Oral Daily    levothyroxine  112 mcg Oral Daily    sodium chloride flush  10 mL Intravenous 2 times per day    metoprolol tartrate  12.5 mg Oral BID    insulin lispro  0-12 Units Subcutaneous TID WC    insulin lispro  0-6 Units Subcutaneous Nightly    atorvastatin  10 mg Oral Nightly     Meds prn: sodium chloride flush, magnesium hydroxide, ondansetron, acetaminophen, LORazepam, glucose, dextrose, glucagon (rDNA), dextrose, heparin (porcine), heparin (porcine)       Impression and Plan:  1. DIAZ on CKD III:creat slightly better but still high and above her baseline  - poor urine output, has borderline blood pressures (but EF ~20%)  - urine lytes reviewed, US:no acute obstruction  - will continue with IVF for now to improve perfusion  - start midodrine for hypotension  - had long discussion with patient's daughter (RAMO Rich ). Cardiology discussing with family regarding urgency of heart cath. - If LHC is urgent/emergent with rising troponin then can proceed with understanding that patient is at high risk of contrast nephropathy in current setting specially given poor cardiac output, hypotension and ongoing oliguria otherwise recommend to hold off for now. 2. Met acidosis: improved with bicarbonate drip  3. Hyperkalemia: resolved. 4. NSTEMI: asymptomatic at this time  5. Elevated liver enzymes:?shock liver vs congestive hepatopathy from low EF, clinically does not appear to be in volume overload stated however.    D/W patient and RN  D/w daughter  Astrid Schlatter, MD  [facility]

## 2019-09-11 NOTE — PROGRESS NOTES
edema, pulses present   Skin: warm and dry, no rash or erythema  Head: normocephalic and atraumatic   Musculoskeletal: normal range of motion, no joint swelling, deformity or tenderness  Neurological: alert, oriented, normal speech, no focal findings or movement disorder noted    Medications:    sodium chloride  250 mL Intravenous Once    aspirin  81 mg Oral Daily    levothyroxine  112 mcg Oral Daily    sodium chloride flush  10 mL Intravenous 2 times per day    metoprolol tartrate  12.5 mg Oral BID    insulin lispro  0-12 Units Subcutaneous TID WC    insulin lispro  0-6 Units Subcutaneous Nightly    atorvastatin  10 mg Oral Nightly      dextrose      sodium bicarbonate infusion 75 mL/hr at 19 0947    heparin (porcine) 13 Units/kg/hr (19 0959)       sodium chloride flush 10 mL PRN   magnesium hydroxide 30 mL Daily PRN   ondansetron 4 mg Q6H PRN   acetaminophen 650 mg Q6H PRN   LORazepam 0.5 mg Q6H PRN   glucose 15 g PRN   dextrose 12.5 g PRN   glucagon (rDNA) 1 mg PRN   dextrose 100 mL/hr PRN   heparin (porcine) 60 Units/kg PRN   heparin (porcine) 30 Units/kg PRN       Diagnostics:  EK-SEP-2019 22:09:36 [facility] ROUTINE RETRIEVAL  Sinus tachycardia  Possible Left atrial enlargement  Right bundle branch block  Septal infarct , age undetermined  Abnormal ECG  When compared with ECG of 21-SEP-2016 13:47,  Ventricular rate has increased BY 40 BPM  Questionable change in QRS duration  Minimal criteria for Inferior infarct are no longer Present  Confirmed by Melanie Sales ([id number]) on 9/10/2019 6:58:02 AM    Echo:   Electronically signed by Trae Cash MD (Interpreting   physician) on 09/10/2019 at 01:06 PM   ----------------------------------------------------------------      Findings      Mitral Valve   The mitral valve structure is normal with normal leaflet separation.   DOPPLER: The transmitral velocity was within the normal range with no   evidence for mitral 19 09/11/2019    BUN 57 09/11/2019    CREATININE 2.2 09/11/2019    LABGLOM 21 09/11/2019    GLUCOSE 221 09/11/2019    CALCIUM 8.3 09/11/2019       Hepatic Function Panel:    Lab Results   Component Value Date    ALKPHOS 61 09/11/2019     09/11/2019     09/11/2019    PROT 5.9 09/11/2019    BILITOT 0.5 09/11/2019    BILIDIR 0.3 09/09/2019    LABALBU 3.5 09/11/2019       Magnesium:    Lab Results   Component Value Date    MG 2.2 09/09/2019       PT/INR:    Lab Results   Component Value Date    INR 0.81 08/14/2012       HgBA1c:    Lab Results   Component Value Date    LABA1C 7.1 07/09/2019       FLP:    Lab Results   Component Value Date    TRIG 106 07/09/2019    HDL 59 07/09/2019    LDLCALC 52 07/09/2019       TSH:    Lab Results   Component Value Date    TSH 3.010 09/09/2019         Assessment:    NSTEMI    CDMP EF 20-25%    SHASHI / CKD stage 3    HTN  HLP  DM II      Plan:  · Long discussion with pt and mainly family re: DIAZ / NSTEMI  · Several questions answered re: why not cathing immediately - family doesn't want to risk HD  · She needs CDMP meds and LV   · Should family want to have immediate cardiac cath then they need to understand and accept the risk of HD need          Electronically signed by MURPHY Young CNP on 9/11/2019 at 5:28 PM

## 2019-09-11 NOTE — PROGRESS NOTES
Patient awake in chair. Vitals obtained check chart. Oriented to place and person, disoriented to situation and time. Patient stated that her leg was in pain \"7\" out of 10. Patient also stated that she was feeling very warm, patient ambulated to the chair via 2 assist. Patient stated being \"a little short of breath\". Mucous membranes pink, moist, and intact. Patient skin warm, moist, and intact. ROM in upper extremities limited. Heart sounds regular. Patient stated feeling \"weak\". Sensation present bilaterally. Bowel sounds active in all four quadrants. Lung sounds were clear bilaterally throughout. Patient has IV sites in the right and left wrist infusing. Patient has full sensation in the lower extremities. ROM is limited in lower extremities bilaterally. Pedal pulses were difficult to palpate 1+ amplitude. Lower extremities were cool to the touch, tan, and moist. Patient chair alarm on. Call light is within reach.

## 2019-09-11 NOTE — CARE COORDINATION
9/11/19, 10:19 AM      [address] day: 2  Location: [facility]06/006- Reason for admit: NSTEMI (non-ST elevated myocardial infarction) [facility]) [I21.4]   Procedure:   9/9 ECHO EF 20-25%  Treatment Plan of Care: WBC 14.3, Creatinine 2.2 (2.3) improved, elevated Troponin/BNP on admit; monitor. Cardiology following for NSTEMI, Nephrology following (optimization prior to Cardiac Cath), BP 80/s; monitor.  Heparin gtt, Bicarb gtt continued  PCP: Blayne Ortega MD  Readmission Risk Score: 17%  Discharge Plan: lives with spouse, daughter Leonela Turner is Palliative Care RN here at [facility] and spoke with her yesterday, family waiting to hear from Dr. Cali Petit per report; hold meet/greet for now; collaborated with Roseanna Finn RN

## 2019-09-12 NOTE — PROGRESS NOTES
Hospitalist Progress Note    Patient:  Lila Cameron      Unit/Bed:4K-06/006-A    YOB: 1934    MRN: [id number]       Acct: [de-identified]     PCP: Maicol Mosquera MD    Date of Admission: 9/9/2019    Chief Complaint: weakness     Hospital Course: 80year old female with a pmh of HTN, DM2 and HLD who presents with generalized weakness. Her symptoms started last night at home. Patient was resting and patient's daughter got a phone call by the patient and was told that patient was unable to move around.       Patient's symptoms wax and weaned throughout the day and it became suddenly worse again tonight and patient was brought to ER for evaluation. ER evaluation showed ischemic EKG change with significantly elevated troponin. Patient was started on heparin drip in ER and was also given ASA. Subjective: pt feels very weak this morning, no apatite. Denies any dizziness, chest pain or sob. Updated son, daughter and .        Medications:  Reviewed    Infusion Medications    dextrose      sodium bicarbonate infusion 75 mL/hr at 09/11/19 0947    heparin (porcine) 17 Units/kg/hr (09/12/19 0547)     Scheduled Medications    ticagrelor  90 mg Oral BID    midodrine  10 mg Oral TID WC    aspirin  81 mg Oral Daily    levothyroxine  112 mcg Oral Daily    sodium chloride flush  10 mL Intravenous 2 times per day    metoprolol tartrate  12.5 mg Oral BID    insulin lispro  0-12 Units Subcutaneous TID WC    insulin lispro  0-6 Units Subcutaneous Nightly    atorvastatin  10 mg Oral Nightly     PRN Meds: sodium chloride flush, magnesium hydroxide, ondansetron, acetaminophen, LORazepam, glucose, dextrose, glucagon (rDNA), dextrose, heparin (porcine), heparin (porcine)      Intake/Output Summary (Last 24 hours) at 9/12/2019 1057  Last data filed at 9/12/2019 0245  Gross per 24 hour   Intake 3097.08 ml   Output 1210 ml   Net 1887.08 ml       Diet:  Dietary Nutrition Supplements: Diabetic Oral carb choices (60 gms)/meal    Code Status: Limited    Assessment/Plan:    Anticipated Discharge in : TBD     Active Hospital Problems    Diagnosis Date Noted    DIAZ (acute kidney injury) ([facility] [N17.9]     Hyperkalemia [T67.5]     Metabolic acidosis [O50.8]     CKD (chronic kidney disease), stage III ([facility] [I72.7]     Systolic dysfunction, left ventricle [I51.9]     Other hypotension [I95.89]     NSTEMI (non-ST elevated myocardial infarction) ([facility] [I21.4] 09/09/2019    Hyperlipidemia [E78.5] 09/05/2012    Diabetes mellitus ([facility] [E11.9] 09/05/2012          Assessment/Plan:     1. NSTEMI: no prior cardiac history. Elevated troponin. ECG changes with ischemia. Cont ASA, Statin, BB and heparin drip. Detailed discussion regarding risks of undergoing LHC and it causing pt to go on dialysis given DIAZ. Multidisciplinary discussion with family, Cardiology and Nephrology. Plan to hold off on LHC for now. Will cont to monitor kidney function.      2. DIAZ on CKD: baseline Cr 1.0-1.2, up to 2.3 from 2.2. Stop Cozaar. IV fluids. Consult nephrology. Strict I/Os. Daily weights.      3. Essential HTN. Hold Cozaar given DIAZ. Monitor BP daily.      4. New Systolic CHF Diagnosis. 2D Echo showing EF 20% with severe global hypokinesis of LV. No evidence of fluid overload. 5. Hypotension. Likely from reduced EF from NSTEMI. EF 20%. Started on Midodrine 10 TID by Nephrology. Cont to monitor. Addendum:  -Pt blood pressure dropping, pt very dizzy, lightheaded and lethargic. Pt is in cardiogenic shock with end organ dysfunction. Discussion with POA (Cassandra Frost) and . Pt to enroll in comfort care/hospice. Start Morphine and Ativan.         Electronically signed by Georgie Weber MD on 9/12/2019 at 10:57 AM

## 2019-09-12 NOTE — PROGRESS NOTES
> 8.9 8.3* 8.0*   MG 2.2  --   --   --  2.1    < > = values in this interval not displayed. Hepatic:   Recent Labs     09/09/19  2223 09/11/19  0305   LABALBU 4.6 3.5   * 343*   * 239*   BILITOT 0.6 0.5   ALKPHOS 77 61         Meds:  Infusion:    dextrose       Meds:    levothyroxine  112 mcg Oral Daily     Meds prn: morphine, LORazepam, glycopyrrolate, sodium chloride flush, magnesium hydroxide, ondansetron, acetaminophen, glucose, dextrose, glucagon (rDNA), dextrose, heparin (porcine), heparin (porcine)       Impression and Plan:  1. DIAZ on CKD III:BUN and creatinine slowly going up again  - poor urine output, has borderline blood pressures (but EF ~20%)  2. Hypotension: Albumin/IVF and midodrine dose (extra) ordered. Family has declined any further aggressive measures including pressors. 3. Marked systolic dysfunction  4. Likely cardiogenic shock  5. NSTEMI  6. Met acidosis: resolved  7. Elevated liver enzymes    D/W Daughter Mortimer Harrison  D/w RN  Goals of care changed per family's wishes  Nephrology will sign off  Please call if any issues/concerns or questions.  Thank you    D/w daughter  Rosa M Lyons MD  [facility]

## 2019-09-12 NOTE — FLOWSHEET NOTE
09/12/19 1100   Provider Notification   Reason for Communication Evaluate;New orders   Provider Name Dr Maral Bliss   Provider Notification Physician   Method of Communication Call   Response See orders   Notification Time [phone]: updated about patient BP being low, order for albumin, 250 ml bolus once, and to increase midodrine 15 mg TID. RN called Kevan Carpio, daughter POA, to update. No answer. Jannet Chester RN called Bere to update and she will be right over.

## 2019-09-13 NOTE — PROGRESS NOTES
Hospice referral completed with  of Sandro Bond, the 4 children including JOSÉ MANUEL Khanna. Hospice concepts, philosophies, and services explained. Pt lying in be alert and able to answer questions. Fragile, weak. Pt noted to have few doses of lorazepam in last 14hrs and 6- 1mg doses morphine IV push with one being administered during referral for noted increase work of breathing with use of accessory muscles. With dose of medication administered symptoms improved. Discussed qualification process as well as different levels of care. Talked about criteria for GIP inpatient hospice care and at this time patient symptoms well controlled with minimual use of medication. Then discussed routine care provided in home vs ECF with cost of room and board. At this time family very overwhelmed and do not think care able to be provided in home as daughter and  are only local family. They report that they may need facility but would be unsure of which facility. Did did discuss with them that would pick a few and go to facility to visit to see how looks, smells, and how greeted by staff. Voiced understanding and are going to need time to discuss among themselves if patient care feasible at home or will need ECF for care. Much emotional support provided. This nurse will check on family later in day. Ask that [facility] hospice staff provide support to family on Saturday and Sunday and evaluate for GIP if patient would meet. Updated Vernadine Riser of need for skilled facility list, printed copy, and gave to family. [facility] hospice will remain available as needed.

## 2019-09-14 NOTE — PROGRESS NOTES
Supportive visit made with family. Pt BP was 77/48 this am. All other VS WDL. Had six doses of IV Morphine yesterday and 1 dose this am so far. Ativan IV x 2 this am. Itching of feet-new. Floor RN obtaining order for benadryl. Last Creatinine on 9/12/19 was 2.3, no labs since. Ideally family would like [facility] hospice if pt qualifies. Daughter Kevan Carpio also going to look at [facility] tomorrow and possibly [facility]. Pt is resting comfortably in bed at present. No s/s of distress.  tearful-emotional support offered. Floor RN to obtain BP again this afternoon, if still low and pt continues to get IV medications on a consistent basis-will consider calling Hospice Medical Director for possible GIP admit.

## 2019-09-14 NOTE — PLAN OF CARE
Problem: Nutrition  Goal: Optimal nutrition therapy  9/13/2019 1411 by Duane Stinson RD, LD  Outcome: Ongoing  Nutrition Problem: Inadequate oral intake  Intervention: Food and/or Nutrient Delivery: Continue current diet, Modify current ONS(Increased ONS to TID per family request)  Nutritional Goals: Pt. will consume 75% or more of meals during LOS.
Problem: Nutrition  Goal: Optimal nutrition therapy  Outcome: Ongoing  Nutrition Problem: Inadequate oral intake  Intervention: Food and/or Nutrient Delivery: Continue current diet, Start ONS  Nutritional Goals: Pt. will consume 75% or more of meals during LOS.
Problem: Sensory:  Goal: Expressions of feelings of enhanced comfort will increase  Description  Expressions of feelings of enhanced comfort will increase  9/14/2019 1010 by Elizabeth Licona RN  Outcome: Met This Shift  9/14/2019 0142 by Leonor Solitario RN  Outcome: Ongoing  Note:   Pt able to express needs. Atvna and Morhine medications given PRN     Problem: Pain:  Goal: Pain level will decrease  Description  Pain level will decrease  Outcome: Ongoing  Note:   Denies any pain. Has itchy feet. Respiration slight labored at times. Given IV ativan and IV MS as needed to keep comfortable. Problem: Falls - Risk of:  Goal: Will remain free from falls  Description  Will remain free from falls   9/14/2019 1010 by Elizabeth Licona RN  Outcome: Ongoing  Note:   On fall precautions. No falls this shift. Remains in bed.  9/14/2019 1010 by Elizabeth Licona RN  Reactivated     Problem: Nutrition  Goal: Optimal nutrition therapy  Outcome: Ongoing  Note:   Little appetite. Fed small amounts of food per family     Problem: Breathing Pattern - Ineffective:  Goal: Able to breathe comfortably  Description  Able to breathe comfortably  9/14/2019 1010 by Elizabeth Licona RN  Outcome: Ongoing  Note:   GIvne Morphine for labored breathing  9/14/2019 0142 by Leonor Solitario RN  Outcome: Ongoing  Note:   Pt on [facility] for comfort. Goal: Absence of respiratory distress  Description  Absence of respiratory distress  9/14/2019 1010 by Elizabeth Licona RN  Outcome: Ongoing  Note:   Medicated with PRN Morphine and ativan for comfort . Able to rest quietly. 9/14/2019 0142 by Leonor Solitario RN  Outcome: Ongoing  Note:   Pt with labored breathing at times.  PRN morphine given as needed for pain and to assist with respirations     Problem: Pain:  Goal: Control of acute pain  Description  Control of acute pain  9/14/2019 1010 by Elizabeth Licona RN  Outcome: Completed  9/14/2019 0142 by Leonor Solitario RN  Outcome:
to maintain appropriate glucose levels will improve  Outcome: Ongoing  Note:   Monitoring chems AC and HS with insulin scale     Problem: Tissue Perfusion - Cardiopulmonary, Altered:  Goal: Absence of angina  Description  Absence of angina  Outcome: Ongoing  Note:   Denies any chest pain. States she just feels so tired    Care plan reviewed with patient and family. Patient and family verbalize understanding of the plan of care and contribute to goal setting.

## 2019-09-15 PROBLEM — Z51.5 HOSPICE CARE: Status: ACTIVE | Noted: 2019-01-01

## 2019-09-15 NOTE — PROGRESS NOTES
Hospitalist Progress Note    Patient:  Joe Montague      Unit/Bed:4K-06/006-A    YOB: 1934    MRN: [id number]       Acct: [de-identified]     PCP: Frank Ambriz MD    Date of Admission: 9/9/2019    Chief Complaint: weakness     Hospital Course: 80year old female with a pmh of HTN, DM2 and HLD who presents with generalized weakness. Her symptoms started last night at home. Patient was resting and patient's daughter got a phone call by the patient and was told that patient was unable to move around.       Patient's symptoms wax and weaned throughout the day and it became suddenly worse again tonight and patient was brought to ER for evaluation. ER evaluation showed ischemic EKG change with significantly elevated troponin. Patient was started on heparin drip in ER and was also given ASA. .    Family requesting comfort care/hospice. Subjective: breathing more labored, apneic breathing. Appears slightly uncomfortable. Family worried. Will place on morphine drip.        Medications:  Reviewed    Infusion Medications    morphine      dextrose       Scheduled Medications    docusate sodium  100 mg Oral Daily    levothyroxine  112 mcg Oral Daily     PRN Meds: diphenhydrAMINE-zinc acetate, diphenhydrAMINE, morphine **OR** morphine, LORazepam, glycopyrrolate, sodium chloride flush, magnesium hydroxide, ondansetron, acetaminophen, glucose, dextrose, glucagon (rDNA), dextrose      Intake/Output Summary (Last 24 hours) at 9/15/2019 0937  Last data filed at 9/14/2019 1215  Gross per 24 hour   Intake 50 ml   Output 0 ml   Net 50 ml       Diet:  DIET CARDIAC; Carb Control: 4 carb choices (60 gms)/meal  Dietary Nutrition Supplements: Diabetic Oral Supplement    Exam:  BP (!) 93/50   Pulse 77   Temp 98.2 °F (36.8 °C) (Oral)   Resp 15   Ht 5' 2\" (1.575 m)   Wt 132 lb 6.4 oz (60.1 kg)   SpO2 100%   BMI 24.22 kg/m²     General appearance: chronically ill appearing   HEENT: Pupils equal, round, and

## 2019-09-15 NOTE — PLAN OF CARE
Problem: Falls - Risk of:  Goal: Absence of physical injury  Description  Absence of physical injury  Outcome: Met This Shift     Problem: Respiratory  Goal: No pulmonary complications  Outcome: Met This Shift     Problem: Falls - Risk of:  Goal: Will remain free from falls  Description  Will remain free from falls  Outcome: Ongoing  Note:   On fall precautions. No falls this shift. On bedrest     Problem: Risk for Impaired Skin Integrity  Goal: Tissue integrity - skin and mucous membranes  Description  Structural intactness and normal physiological function of skin and  mucous membranes. Outcome: Ongoing  Note:   No new skin breakdown. Turned q 2 hours     Problem: Pain Control  Goal: Maintain pain level at or below patient's acceptable level (or 5 if patient is unable to determine acceptable level)  Outcome: Ongoing  Note:   On Hospice care. IV MS PCA on at 2 mg/ hour . Given IV Ativan PRN for restlessness     Problem: Spiritual  Goal: Supportive care provided  Outcome: Ongoing  Note:   Supportive care given to family    Care plan reviewed with patient and family. Patient and family verbalize understanding of the plan of care and contribute to goal setting.

## 2019-09-16 NOTE — DISCHARGE SUMMARY
Resp: 16 16 15 12   Temp: 98.3 °F (36.8 °C) 98.2 °F (36.8 °C) 98.2 °F (36.8 °C) 97.5 °F (36.4 °C)   TempSrc: Oral Oral Oral Oral   SpO2: 99% 98% 100% 100%   Weight:       Height:         Weight: Weight: 132 lb 6.4 oz (60.1 kg)     24 hour intake/output:No intake or output data in the 24 hours ending 09/16/19 0737      Labs: For convenience and continuity at follow-up the following most recent labs are provided:      CBC:    Lab Results   Component Value Date    WBC 14.3 09/11/2019    HGB 10.1 09/11/2019    HCT 31.4 09/11/2019     09/11/2019       Renal:    Lab Results   Component Value Date     09/12/2019    K 3.9 09/12/2019    K 3.8 09/11/2019    CL 95 09/12/2019    CO2 22 09/12/2019    BUN 74 09/12/2019    CREATININE 2.3 09/12/2019    CALCIUM 8.0 09/12/2019         Significant Diagnostic Studies    Radiology:   US RENAL COMPLETE   Final Result      No evidence of hydronephrosis. **This report has been created using voice recognition software. It may contain minor errors which are inherent in voice recognition technology. **      Final report electronically signed by Dr. Lyudmila Girard on 9/10/2019 5:14 PM      CT HEAD WO CONTRAST   Final Result   1. Stable cerebral atrophy changes. 2. Negative exam for active appearing pathology. 3. Chronic appearing changes of the left maxillary sinus discussed above. **This report has been created using voice recognition software. It may contain minor errors which are inherent in voice recognition technology. **      Final report electronically signed by Dr. David Romero on 9/9/2019 11:21 PM      XR KNEE LEFT (MIN 4 VIEWS)   Final Result   1. Degenerative changes of left knee. **This report has been created using voice recognition software. It may contain minor errors which are inherent in voice recognition technology. **      Final report electronically signed by Dr. David Romero on 9/9/2019 11:31 PM      XR CHEST 1 VW Final Result   . Suspected chronic lung changes with minimal left base scarring of excluded. No other active pathology of the chest suspected. **This report has been created using voice recognition software. It may contain minor errors which are inherent in voice recognition technology. **      Final report electronically signed by Dr. Jane Agustin on 9/9/2019 11:24 PM      XR HIP 2-3 VW W PELVIS LEFT   Final Result   1. Chronic advanced degenerative arthropathy of the left hip without fracture or dislocation. 2. Postsurgical changes of right hip arthroplasty. Final report electronically signed by Dr. Jane Agustin on 9/9/2019 11:23 PM             Consults:     STR ED TO IP CONSULT  STR ED TO IP CONSULT  IP CONSULT TO CARDIOLOGY  IP CONSULT TO NEPHROLOGY  IP CONSULT TO SPIRITUAL SERVICES  IP CONSULT TO HOSPICE      Condition at Discharge: passed away on 9/15 at 2037     Time Spent on discharge is more than 30 minutes in the examination, evaluation, counseling and review of medications and discharge plan. Signed: Thank you Bernadine Ca MD for the opportunity to be involved in this patient's care.     Electronically signed by Jarrett Sosa MD on 9/16/2019 at 7:37 AM

## 2023-10-26 NOTE — PROGRESS NOTES
Hospitalist Progress Note    Patient:  Glynn Last      Unit/Bed:4K-06/006-A    YOB: 1934    MRN: [id number]       Acct: [de-identified]     PCP: Johnna Del Angel MD    Date of Admission: 9/9/2019    Chief Complaint: weakness     Hospital Course: 80year old female with a pmh of HTN, DM2 and HLD who presents with generalized weakness. Her symptoms started last night at home. Patient was resting and patient's daughter got a phone call by the patient and was told that patient was unable to move around.       Patient's symptoms wax and weaned throughout the day and it became suddenly worse again tonight and patient was brought to ER for evaluation. ER evaluation showed ischemic EKG change with significantly elevated troponin. Patient was started on heparin drip in ER and was also given ASA. Subjective: continues to feel very weak, tired and fatigued. Having dizziness and fogginess. Not eating at all. No apatite. Medications:  Reviewed    Infusion Medications    dextrose       Scheduled Medications    levothyroxine  112 mcg Oral Daily     PRN Meds: morphine, LORazepam, glycopyrrolate, sodium chloride flush, magnesium hydroxide, ondansetron, acetaminophen, glucose, dextrose, glucagon (rDNA), dextrose, heparin (porcine), heparin (porcine)      Intake/Output Summary (Last 24 hours) at 9/13/2019 0815  Last data filed at 9/13/2019 0301  Gross per 24 hour   Intake 85 ml   Output 100 ml   Net -15 ml       Diet:  Dietary Nutrition Supplements: Diabetic Oral Supplement  DIET CARDIAC; Carb Control: 4 carb choices (60 gms)/meal    Exam:  BP (!) 92/56   Pulse 85   Temp 98.3 °F (36.8 °C) (Oral)   Resp 22   Ht 5' 2\" (1.575 m)   Wt 132 lb 6.4 oz (60.1 kg)   SpO2 98%   BMI 24.22 kg/m²     General appearance: No apparent distress, appears stated age and cooperative. HEENT: Pupils equal, round, and reactive to light. Conjunctivae/corneas clear. Neck: Supple, with full range of motion.  No Patient with one or more new problems requiring additional work-up/treatment.